# Patient Record
Sex: FEMALE | Race: WHITE | Employment: OTHER | URBAN - METROPOLITAN AREA
[De-identification: names, ages, dates, MRNs, and addresses within clinical notes are randomized per-mention and may not be internally consistent; named-entity substitution may affect disease eponyms.]

---

## 2017-01-20 ENCOUNTER — HOSPITAL ENCOUNTER (OUTPATIENT)
Dept: MAMMOGRAPHY | Age: 72
Discharge: HOME | End: 2017-01-20
Attending: INTERNAL MEDICINE | Admitting: INTERNAL MEDICINE

## 2017-01-20 DIAGNOSIS — Z12.31 VISIT FOR SCREENING MAMMOGRAM: ICD-10-CM

## 2017-12-01 NOTE — PRE-PROCEDURE INSTRUCTIONS
PRE OP INSTRUCTION SHEET   1. Do not eat or drink anything after 12 midnight  prior to surgery. This includes no water, chewing gum or mints. 2. Take the following pills will a small sip of water (see MAR)                                        3. Aspirin, Ibuprofen, Advil, Naproxen, Vitamin E, fish oil and other Anti-inflammatory products should be stopped for 5 days before surgery or as directed by your physician. 4. Check with your Doctor regarding stopping Plavix, Coumadin, Lovenox, Fragmin or other blood thinners   5. Do not smoke, and do not drink any alcoholic beverages 24 hours prior to surgery. This includes NA Beer. 6. You may brush your teeth and gargle the morning of surgery. DO NOT SWALLOW WATER   7. You MUST make arrangements for a responsible adult to take you home after your surgery. You will not be allowed to leave alone or drive yourself home. It is strongly suggested someone stay with you the first 24 hrs. Your surgery will be cancelled if you do not have a ride home. 8. A parent/legal guardian must accompany a child scheduled for surgery and plan to stay at the hospital until the child is discharged. Please do not bring other children with you. 9. Please wear simple, loose fitting clothing to the hospital.  Tomas Boast not bring valuables (money, credit cards, checkbooks, etc.) Do not wear any makeup (including no eye makeup) or nail polish on your fingers or toes. 10. DO NOT wear any jewelry or piercings on day of surgery. All body piercing jewelry must be removed. 11. If you have dentures,glasses, or contacts they will be removed before going to the OR; we will provide you a container. 12. Please see your family doctor/and cardiologist for a history & physical and/or concerning medications. Bring any test results/reports from your physician's office. Have history and labs faxed to [phone].  Remember to bring Blood Bank bracelet on the day of surgery. 14. If you have a Living Will and Durable Power of  for Healthcare, please bring in a copy. 13. Notify your Surgeon if you develop any illness between now and surgery  time, cough, cold, fever, sore throat, nausea, vomiting, etc.  Please notify your surgeon if you experience dizziness, shortness of breath or blurred vision between now & the time of your surgery   16. DO NOT shave your operative site 96 hours prior to surgery. For face & neck surgery, men may use an electric razor 48 hours prior to surgery. 17. Shower with _x__Antibacterial soap (x_chlorhexidine for total joint  Pt's) shower two times before surgery.(the morning of and the night before. 18. To provide excellent care visitors will be limited to one in the room at any given time.   Please call pre admission testing if you any further questions [phone] or [phone]

## 2017-12-01 NOTE — PROGRESS NOTES
Obstructive Sleep Apnea (RADHA) Screening     Patient:  Kelly Sparks    YOB: 1945      Medical Record #:  [id number]                     Date:  12/1/2017     1. Are you a loud and/or regular snorer? []  Yes       [x] No    2. Have you been observed to gasp or stop breathing during sleep? []  Yes       [x] No    3. Do you feel tired or groggy upon awakening or do you awaken with a headache?           []  Yes       [x] No    4. Are you often tired or fatigued during the wake time hours? []  Yes       [x] No    5. Do you fall asleep sitting, reading, watching TV or driving? []  Yes       [x] No    6. Do you often have problems with memory or concentration? []  Yes       [x] No    **If patient's score is ? 3 they are considered high risk for RADHA. Notify the anesthesiologist of the high risk and document in focus note. Note:  If the patient's BMI is more than 35 kg m¯² , has neck circumference > 40 cm, and/or high blood pressure the risk is greater (© [employer], 2006).

## 2017-12-07 ENCOUNTER — HOSPITAL ENCOUNTER (OUTPATIENT)
Dept: SURGERY | Age: 72
Discharge: HOME | End: 2017-12-07
Attending: OPHTHALMOLOGY | Admitting: OPHTHALMOLOGY

## 2017-12-07 VITALS
OXYGEN SATURATION: 96 % | DIASTOLIC BLOOD PRESSURE: 71 MMHG | HEART RATE: 73 BPM | HEIGHT: 64 IN | WEIGHT: 136 LBS | RESPIRATION RATE: 74 BRPM | BODY MASS INDEX: 23.22 KG/M2 | TEMPERATURE: 97 F | SYSTOLIC BLOOD PRESSURE: 135 MMHG

## 2017-12-07 RX ORDER — ONDANSETRON 2 MG/ML
4 INJECTION INTRAMUSCULAR; INTRAVENOUS
Status: ACTIVE | OUTPATIENT
Start: 2017-12-07 | End: 2017-12-07

## 2017-12-07 RX ORDER — PREDNISOLONE ACETATE 10 MG/ML
1 SUSPENSION/ DROPS OPHTHALMIC CONTINUOUS
Status: DISCONTINUED | OUTPATIENT
Start: 2017-12-07 | End: 2017-12-08 | Stop reason: HOSPADM

## 2017-12-07 RX ORDER — HYDRALAZINE HYDROCHLORIDE 20 MG/ML
5 INJECTION INTRAMUSCULAR; INTRAVENOUS EVERY 10 MIN PRN
Status: DISCONTINUED | OUTPATIENT
Start: 2017-12-07 | End: 2017-12-08 | Stop reason: HOSPADM

## 2017-12-07 RX ORDER — MORPHINE SULFATE 10 MG/ML
2 INJECTION, SOLUTION INTRAMUSCULAR; INTRAVENOUS EVERY 5 MIN PRN
Status: DISCONTINUED | OUTPATIENT
Start: 2017-12-07 | End: 2017-12-08 | Stop reason: HOSPADM

## 2017-12-07 RX ORDER — MEPERIDINE HYDROCHLORIDE 25 MG/ML
12.5 INJECTION INTRAMUSCULAR; INTRAVENOUS; SUBCUTANEOUS EVERY 5 MIN PRN
Status: DISCONTINUED | OUTPATIENT
Start: 2017-12-07 | End: 2017-12-08 | Stop reason: HOSPADM

## 2017-12-07 RX ORDER — LIDOCAINE HYDROCHLORIDE 10 MG/ML
0.3 INJECTION, SOLUTION EPIDURAL; INFILTRATION; INTRACAUDAL; PERINEURAL
Status: DISPENSED | OUTPATIENT
Start: 2017-12-07 | End: 2017-12-07

## 2017-12-07 RX ORDER — HYDROMORPHONE HCL 110MG/55ML
0.25 PATIENT CONTROLLED ANALGESIA SYRINGE INTRAVENOUS EVERY 5 MIN PRN
Status: DISCONTINUED | OUTPATIENT
Start: 2017-12-07 | End: 2017-12-08 | Stop reason: HOSPADM

## 2017-12-07 RX ORDER — LABETALOL HYDROCHLORIDE 5 MG/ML
5 INJECTION, SOLUTION INTRAVENOUS EVERY 10 MIN PRN
Status: DISCONTINUED | OUTPATIENT
Start: 2017-12-07 | End: 2017-12-08 | Stop reason: HOSPADM

## 2017-12-07 RX ORDER — DIPHENHYDRAMINE HYDROCHLORIDE 50 MG/ML
12.5 INJECTION INTRAMUSCULAR; INTRAVENOUS
Status: ACTIVE | OUTPATIENT
Start: 2017-12-07 | End: 2017-12-07

## 2017-12-07 RX ORDER — OXYCODONE HYDROCHLORIDE AND ACETAMINOPHEN 5; 325 MG/1; MG/1
2 TABLET ORAL PRN
Status: ACTIVE | OUTPATIENT
Start: 2017-12-07 | End: 2017-12-07

## 2017-12-07 RX ORDER — SODIUM CHLORIDE 0.9 % (FLUSH) 0.9 %
10 SYRINGE (ML) INJECTION PRN
Status: DISCONTINUED | OUTPATIENT
Start: 2017-12-07 | End: 2017-12-08 | Stop reason: HOSPADM

## 2017-12-07 RX ORDER — SODIUM CHLORIDE, SODIUM LACTATE, POTASSIUM CHLORIDE, CALCIUM CHLORIDE 600; 310; 30; 20 MG/100ML; MG/100ML; MG/100ML; MG/100ML
INJECTION, SOLUTION INTRAVENOUS CONTINUOUS
Status: DISCONTINUED | OUTPATIENT
Start: 2017-12-07 | End: 2017-12-08 | Stop reason: HOSPADM

## 2017-12-07 RX ORDER — SODIUM CHLORIDE 0.9 % (FLUSH) 0.9 %
10 SYRINGE (ML) INJECTION EVERY 12 HOURS SCHEDULED
Status: DISCONTINUED | OUTPATIENT
Start: 2017-12-07 | End: 2017-12-08 | Stop reason: HOSPADM

## 2017-12-07 RX ORDER — HYDROMORPHONE HCL 110MG/55ML
0.5 PATIENT CONTROLLED ANALGESIA SYRINGE INTRAVENOUS EVERY 5 MIN PRN
Status: DISCONTINUED | OUTPATIENT
Start: 2017-12-07 | End: 2017-12-08 | Stop reason: HOSPADM

## 2017-12-07 RX ORDER — PROMETHAZINE HYDROCHLORIDE 25 MG/ML
6.25 INJECTION, SOLUTION INTRAMUSCULAR; INTRAVENOUS
Status: ACTIVE | OUTPATIENT
Start: 2017-12-07 | End: 2017-12-07

## 2017-12-07 RX ORDER — TOBRAMYCIN AND DEXAMETHASONE 3; 1 MG/ML; MG/ML
1 SUSPENSION/ DROPS OPHTHALMIC CONTINUOUS
Status: DISCONTINUED | OUTPATIENT
Start: 2017-12-07 | End: 2017-12-08 | Stop reason: HOSPADM

## 2017-12-07 RX ORDER — OXYCODONE HYDROCHLORIDE AND ACETAMINOPHEN 5; 325 MG/1; MG/1
1 TABLET ORAL PRN
Status: ACTIVE | OUTPATIENT
Start: 2017-12-07 | End: 2017-12-07

## 2017-12-07 RX ORDER — MORPHINE SULFATE 4 MG/ML
1 INJECTION, SOLUTION INTRAMUSCULAR; INTRAVENOUS EVERY 5 MIN PRN
Status: DISCONTINUED | OUTPATIENT
Start: 2017-12-07 | End: 2017-12-08 | Stop reason: HOSPADM

## 2017-12-07 RX ADMIN — SODIUM CHLORIDE, SODIUM LACTATE, POTASSIUM CHLORIDE, CALCIUM CHLORIDE: 600; 310; 30; 20 INJECTION, SOLUTION INTRAVENOUS at 11:14

## 2017-12-07 RX ADMIN — TOBRAMYCIN AND DEXAMETHASONE 1 DROP: 3; 1 SUSPENSION/ DROPS OPHTHALMIC at 13:30

## 2017-12-07 ASSESSMENT — PAIN SCALES - GENERAL
PAINLEVEL_OUTOF10: 0
PAINLEVEL_OUTOF10: 0

## 2017-12-07 ASSESSMENT — PAIN - FUNCTIONAL ASSESSMENT: PAIN_FUNCTIONAL_ASSESSMENT: 0-10

## 2017-12-07 NOTE — ANESTHESIA PRE-OP
Department of Anesthesiology  Preprocedure Note       Name:  Lauren Weinberg   Age:  67 y.o.  :  1945                                          MRN:  [id number]         Date:  2017      Surgeon:    Procedure:    Medications prior to admission:   Prior to Admission medications    Medication Sig Start Date End Date Taking?  Authorizing Provider   fluticasone-salmeterol (ADVAIR HFA) 230-21 MCG/ACT inhaler Inhale 2 puffs into the lungs 2 times daily   Yes Historical Provider, MD   aspirin 325 MG tablet Take 325 mg by mouth daily   Yes Historical Provider, MD   Azelastine-Fluticasone 137-50 MCG/ACT SUSP by Nasal route   Yes Historical Provider, MD   venlafaxine (EFFEXOR XR) 75 MG extended release capsule Take 75 mg by mouth daily   Yes Historical Provider, MD   omeprazole (PRILOSEC) 20 MG delayed release capsule Take 40 mg by mouth daily   Yes Historical Provider, MD   valACYclovir (VALTREX) 500 MG tablet Take 1,000 mg by mouth 2 times daily   Yes Historical Provider, MD   amLODIPine (NORVASC) 2.5 MG tablet Take 2.5 mg by mouth daily   Yes Historical Provider, MD   ClonazePAM (KLONOPIN PO) Take 0.5 mg by mouth 3 times daily    Yes Historical Provider, MD   Simvastatin (ZOCOR PO) Take 20 mg by mouth nightly    Yes Historical Provider, MD   gabapentin (NEURONTIN) 100 MG capsule Take 300 mg by mouth 3 times daily    Yes Historical Provider, MD   Multiple Vitamins-Minerals (THERAPEUTIC MULTIVITAMIN-MINERALS) tablet Take 1 tablet by mouth daily   Yes Historical Provider, MD   albuterol sulfate  (90 BASE) MCG/ACT inhaler Inhale 2 puffs into the lungs every 6 hours as needed for Wheezing    Historical Provider, MD       Current medications:    Current Outpatient Prescriptions   Medication Sig Dispense Refill    fluticasone-salmeterol (ADVAIR HFA) 230-21 MCG/ACT inhaler Inhale 2 puffs into the lungs 2 times daily      aspirin 325 MG tablet Take 325 mg by mouth daily      Azelastine-Fluticasone 137-50 MCG/ACT SUSP by Nasal route      venlafaxine (EFFEXOR XR) 75 MG extended release capsule Take 75 mg by mouth daily      omeprazole (PRILOSEC) 20 MG delayed release capsule Take 40 mg by mouth daily      valACYclovir (VALTREX) 500 MG tablet Take 1,000 mg by mouth 2 times daily      amLODIPine (NORVASC) 2.5 MG tablet Take 2.5 mg by mouth daily      ClonazePAM (KLONOPIN PO) Take 0.5 mg by mouth 3 times daily       Simvastatin (ZOCOR PO) Take 20 mg by mouth nightly       gabapentin (NEURONTIN) 100 MG capsule Take 300 mg by mouth 3 times daily       Multiple Vitamins-Minerals (THERAPEUTIC MULTIVITAMIN-MINERALS) tablet Take 1 tablet by mouth daily      albuterol sulfate  (90 BASE) MCG/ACT inhaler Inhale 2 puffs into the lungs every 6 hours as needed for Wheezing       Current Facility-Administered Medications   Medication Dose Route Frequency Provider Last Rate Last Dose    bromfenac (PROLENSA) 0.07 % ophthalmic solution 1 drop  1 drop Left Eye Continuous Kevin Fernandez MD        prednisoLONE acetate (PRED FORTE) 1 % ophthalmic suspension 1 drop  1 drop Ophthalmic Continuous Kevin Fernandez MD        tobramycin-dexamethasone [facility]) ophthalmic suspension 1 drop  1 drop Ophthalmic Continuous Kevin Fernandez MD        lactated ringers infusion   Intravenous Continuous Gen Downing  mL/hr at 12/07/17 1114      sodium chloride flush 0.9 % injection 10 mL  10 mL Intravenous 2 times per day Gen Downing MD        sodium chloride flush 0.9 % injection 10 mL  10 mL Intravenous PRN Gen Downing MD        lidocaine PF 1 % injection 0.3 mL  0.3 mL Intradermal Once PRN Gen Downing MD        lidocaine PF 2 % in hylenex   Intraocular Once Kevin Fernandez MD           Allergies:     Allergies   Allergen Reactions    Amoxicillin Hives    Dicloxacillin Hives    Hexachlorophene      Phisohex - rash    Pcn [Penicillins] Hives       Problem List:    Patient Active Problem List   Diagnosis Code    Pulmonary:   (+) asthma:                            Cardiovascular:    (+) hypertension:,          Beta Blocker:  Not on Beta Blocker         Neuro/Psych:   (+) neuromuscular disease:, psychiatric history:            GI/Hepatic/Renal:   (+) GERD: well controlled,           Endo/Other: Negative Endo/Other ROS                    Abdominal:           Vascular:                                        Anesthesia Plan      MAC     ASA 2     (Risks, benefits and alternatives of MAC anesthesia discussed with pt. Questions answered. Willing to proceed.)  Induction: intravenous. Anesthetic plan and risks discussed with patient.                       Penny Pena MD   12/7/2017

## 2018-01-02 ENCOUNTER — HOSPITAL ENCOUNTER (OUTPATIENT)
Dept: SURGERY | Age: 73
Discharge: HOME | End: 2018-01-02
Attending: OPHTHALMOLOGY | Admitting: OPHTHALMOLOGY

## 2018-01-02 VITALS
HEART RATE: 65 BPM | BODY MASS INDEX: 23.05 KG/M2 | SYSTOLIC BLOOD PRESSURE: 145 MMHG | DIASTOLIC BLOOD PRESSURE: 71 MMHG | RESPIRATION RATE: 16 BRPM | TEMPERATURE: 97.3 F | OXYGEN SATURATION: 95 % | HEIGHT: 64 IN | WEIGHT: 135 LBS

## 2018-01-02 RX ORDER — SODIUM CHLORIDE, SODIUM LACTATE, POTASSIUM CHLORIDE, CALCIUM CHLORIDE 600; 310; 30; 20 MG/100ML; MG/100ML; MG/100ML; MG/100ML
INJECTION, SOLUTION INTRAVENOUS CONTINUOUS
Status: DISCONTINUED | OUTPATIENT
Start: 2018-01-02 | End: 2018-01-03 | Stop reason: HOSPADM

## 2018-01-02 RX ORDER — DICYCLOMINE HCL 20 MG
TABLET ORAL
Refills: 0 | COMMUNITY
Start: 2017-12-14 | End: 2022-01-03

## 2018-01-02 RX ADMIN — SODIUM CHLORIDE, SODIUM LACTATE, POTASSIUM CHLORIDE, CALCIUM CHLORIDE: 600; 310; 30; 20 INJECTION, SOLUTION INTRAVENOUS at 09:22

## 2018-01-02 ASSESSMENT — PAIN - FUNCTIONAL ASSESSMENT: PAIN_FUNCTIONAL_ASSESSMENT: 0-10

## 2018-01-02 ASSESSMENT — PAIN SCALES - GENERAL: PAINLEVEL_OUTOF10: 0

## 2018-01-02 NOTE — ANESTHESIA PRE-OP
Department of Anesthesiology  Preprocedure Note       Name:  Chari Phoenix   Age:  67 y.o.  :  1945                                          MRN:  [id number]         Date:  2018      Surgeon:    Procedure:    Medications prior to admission:   Prior to Admission medications    Medication Sig Start Date End Date Taking?  Authorizing Provider   Calcium Carbonate-Vitamin D 500-400 MG-UNIT TABS Take by mouth   Yes Historical Provider, MD   dicyclomine (BENTYL) 20 MG tablet TK 1 T PO BID 17  Yes Historical Provider, MD   fluticasone-salmeterol (ADVAIR HFA) 230-21 MCG/ACT inhaler Inhale 2 puffs into the lungs 2 times daily   Yes Historical Provider, MD   albuterol sulfate  (90 BASE) MCG/ACT inhaler Inhale 2 puffs into the lungs every 6 hours as needed for Wheezing   Yes Historical Provider, MD   venlafaxine (EFFEXOR XR) 75 MG extended release capsule Take 75 mg by mouth daily   Yes Historical Provider, MD   omeprazole (PRILOSEC) 20 MG delayed release capsule Take 40 mg by mouth daily   Yes Historical Provider, MD   valACYclovir (VALTREX) 500 MG tablet Take 1,000 mg by mouth 2 times daily   Yes Historical Provider, MD   amLODIPine (NORVASC) 2.5 MG tablet Take 2.5 mg by mouth daily   Yes Historical Provider, MD   ClonazePAM (KLONOPIN PO) Take 0.5 mg by mouth 3 times daily    Yes Historical Provider, MD   Simvastatin (ZOCOR PO) Take 20 mg by mouth nightly    Yes Historical Provider, MD   aspirin 325 MG tablet Take 325 mg by mouth daily    Historical Provider, MD   Multiple Vitamins-Minerals (THERAPEUTIC MULTIVITAMIN-MINERALS) tablet Take 1 tablet by mouth daily    Historical Provider, MD       Current medications:    Current Outpatient Prescriptions   Medication Sig Dispense Refill    Calcium Carbonate-Vitamin D 500-400 MG-UNIT TABS Take by mouth      dicyclomine (BENTYL) 20 MG tablet TK 1 T PO BID  0    fluticasone-salmeterol (ADVAIR HFA) 230-21 MCG/ACT inhaler Inhale 2 puffs into the lungs 2 times daily      albuterol sulfate  (90 BASE) MCG/ACT inhaler Inhale 2 puffs into the lungs every 6 hours as needed for Wheezing      venlafaxine (EFFEXOR XR) 75 MG extended release capsule Take 75 mg by mouth daily      omeprazole (PRILOSEC) 20 MG delayed release capsule Take 40 mg by mouth daily      valACYclovir (VALTREX) 500 MG tablet Take 1,000 mg by mouth 2 times daily      amLODIPine (NORVASC) 2.5 MG tablet Take 2.5 mg by mouth daily      ClonazePAM (KLONOPIN PO) Take 0.5 mg by mouth 3 times daily       Simvastatin (ZOCOR PO) Take 20 mg by mouth nightly       aspirin 325 MG tablet Take 325 mg by mouth daily      Multiple Vitamins-Minerals (THERAPEUTIC MULTIVITAMIN-MINERALS) tablet Take 1 tablet by mouth daily       Current Facility-Administered Medications   Medication Dose Route Frequency Provider Last Rate Last Dose    lactated ringers infusion   Intravenous Continuous Donnell Norman MD 50 mL/hr at 01/02/18 0922      lidocaine PF 2 % in hylenex   Intraocular Once Melita Bass MD           Allergies:     Allergies   Allergen Reactions    Amoxicillin Hives    Dicloxacillin Hives    Gluten Meal Diarrhea    Hexachlorophene      Phisohex - rash    Lactose     Pcn [Penicillins] Hives       Problem List:    Patient Active Problem List   Diagnosis Code    Chronic bilateral low back pain M54.5, G89.29    Lumbar degenerative disc disease    SEVERE L45  M51.36    Fracture, lumbar vertebra, compression (HCC)   L5 AND L4 S32.000A    Lumbar spine instability   L45 M53.2X6       Past Medical History:        Diagnosis Date    Asthma     Back pain     Chronic pain     hip    Compression fracture     fourth lumbar vertebra with delayed healing    DDD (degenerative disc disease), cervical     Depression     Hyperlipidemia     Hypertension     Lumbar stenosis     Osteopenia     Osteoporosis     PONV (postoperative nausea and vomiting)     Sciatica     Seasonal allergies

## 2018-01-02 NOTE — OP NOTE
OPERATIVE NOTE    Patient Name   Date of Birth Age  MRN#  Valiant Gehrig   1945   67 y.o.  [id number]       Surgeon        Surgery Date  Mindy Fernandez        1/2/2018       Preoperative Diagnosis: Nuclear Sclerotic Cataract right eye    Postoperative Diagnosis: Nuclear Sclerotic Cataract right eye    Operative Procedure: Phacoemulsification/ Posterior Chamber Intraocular Lens Implantation          Anesthesia:   Peribulbar Block with MAC    Details of Procedure: The patient was brought to the operating room on the operative stretcher in the supine position with the head resting in the head rest.  After appropriate anesthesia monitoring devices were applied, IV sedation was carried out per the anesthesia service. Once sedation was achieved, a peribulbar block was performed, using a 5 mL combination solution containing 4 mL of 2% lidocaine with 1 mL of Vitrase. Approximately 2-3 mL of this solution was administered in a peribulbar fashion through the lower lid of the operative eye. Once appropriate akinesia and anesthesia were demonstrated, the operative eye was prepped and draped in the usual sterile fashion. Tobradex drops and Tetracain drops were administered. A wire lid speculum was then inserted into the operative eye. A paracentesis was then performed using a 15 degree supersharp blade to enter the globe at the limbus, and a .12 mm colibri forceps was used to stabilize the globe. Viscoat was then instilled into the anterior chamber. A temporal clear cornea groove was then created using the 15 degree supersharp blade. The cornea was then entered using the Tom 2.4 mm clearcut keratome blade. The capsulotomy was then performed using a cystotome, and the capsulorrhexis was completed using uttrata forceps. The nucleus of the cataract was then hydrodissected and hydrodelineated using sterile BSS on a 27 gauge cannula.   The nucleus of the cataract was then removed using the phacoemilsification/aspiration device. This was performed in a bimanual/CHOP technique. The remaining cortex was then removed using the irrigation/aspiration device. The posterior capsule was polished using the I/A device with CAP/VAC settings. The capsular bag was reformed using Provisc. The previously selected Tom Acrysof +23.0 diopter [id number] intraocular lens implant was then inserted using the cartridge system. It was spun in place using a Kuglen hook. It was centered and found to be in good, stable position. The remaining viscoelastic was then removed using the I/A device. The corneal incision was then hydrated using sterile BSS on a 30 gauge cannula. It was checked and found to be water-tight. Pilocarpine 2%, followed by Tobradex ophthalmic solutions were then instilled into the operative eye. The wire lid speculum was removed, and a postoperative protective shield was applied. The patient was then transferred to the postanesthesia recovery unit in good stable condition. The patient tolerated the procedure well without complications. A postoperative instruction sheet was given, and the patient will follow up with Dr. Disha Salazar office as scheduled.       EBL: none    Specimen: none

## 2018-01-02 NOTE — ANESTHESIA POST-OP
Postoperative Anesthesia Note    Name:    Oliver Bolaños  MRN:      [id number]    Patient Vitals for the past 12 hrs:   BP Temp Temp src Pulse Resp SpO2 Height Weight   01/02/18 1124 (!) 145/71 - - 65 16 95 % - -   01/02/18 0909 (!) 157/69 97.3 °F (36.3 °C) Temporal 60 16 97 % 5' 4\" (1.626 m) 135 lb (61.2 kg)        LABS:    CBC  Lab Results   Component Value Date/Time    WBC 15.8 (H) 10/03/2016 12:55 PM    HGB 14.6 10/03/2016 12:55 PM    HCT 44.2 10/03/2016 12:55 PM     10/03/2016 12:55 PM     RENAL  No results found for: NA, K, CL, CO2, BUN, CREATININE, GLUCOSE  COAGS  No results found for: PROTIME, INR, APTT    Intake & Output: In: 400 [I.V.:400]  Out: -     Nausea & Vomiting:  No    Level of Consciousness:  Awake    Pain Assessment:  Adequate analgesia    Anesthesia Complications:  No apparent anesthetic complications    SUMMARY      Vital signs stable  OK to discharge from Stage I post anesthesia care.   Care transferred from Anesthesiology department on discharge from perioperative area

## 2018-07-30 ENCOUNTER — HOSPITAL ENCOUNTER (OUTPATIENT)
Dept: MAMMOGRAPHY | Age: 73
Discharge: HOME | End: 2018-07-30
Payer: MEDICARE

## 2018-07-30 DIAGNOSIS — Z12.31 VISIT FOR SCREENING MAMMOGRAM: ICD-10-CM

## 2018-07-30 PROCEDURE — 77063 BREAST TOMOSYNTHESIS BI: CPT

## 2019-12-13 ENCOUNTER — HOSPITAL ENCOUNTER (OUTPATIENT)
Dept: MAMMOGRAPHY | Age: 74
Discharge: HOME | End: 2019-12-13
Payer: MEDICARE

## 2019-12-13 DIAGNOSIS — Z12.31 VISIT FOR SCREENING MAMMOGRAM: ICD-10-CM

## 2019-12-13 PROCEDURE — 77063 BREAST TOMOSYNTHESIS BI: CPT

## 2019-12-27 ENCOUNTER — HOSPITAL ENCOUNTER (OUTPATIENT)
Dept: MAMMOGRAPHY | Age: 74
Discharge: HOME | End: 2019-12-27
Payer: MEDICARE

## 2019-12-27 DIAGNOSIS — R92.8 ABNORMAL MAMMOGRAM: ICD-10-CM

## 2019-12-27 PROCEDURE — G0279 TOMOSYNTHESIS, MAMMO: HCPCS

## 2020-05-23 ENCOUNTER — APPOINTMENT (OUTPATIENT)
Dept: CT IMAGING | Age: 75
DRG: 871 | End: 2020-05-23
Payer: MEDICARE

## 2020-05-23 ENCOUNTER — HOSPITAL ENCOUNTER (INPATIENT)
Age: 75
LOS: 3 days | Discharge: HOME | DRG: 871 | End: 2020-05-26
Attending: EMERGENCY MEDICINE | Admitting: INTERNAL MEDICINE
Payer: MEDICARE

## 2020-05-23 PROBLEM — K52.9 COLITIS: Status: ACTIVE | Noted: 2020-05-23

## 2020-05-23 LAB
A/G RATIO: 1.4 (ref 1.1–2.2)
ALBUMIN SERPL-MCNC: 4.4 G/DL (ref 3.4–5)
ALP BLD-CCNC: 77 U/L (ref 40–129)
ALT SERPL-CCNC: 10 U/L (ref 10–40)
ANION GAP SERPL CALCULATED.3IONS-SCNC: 13 MMOL/L (ref 3–16)
AST SERPL-CCNC: 22 U/L (ref 15–37)
BACTERIA: ABNORMAL /HPF
BANDED NEUTROPHILS RELATIVE PERCENT: 2 % (ref 0–7)
BASOPHILS ABSOLUTE: 0 K/UL (ref 0–0.2)
BASOPHILS RELATIVE PERCENT: 0 %
BILIRUB SERPL-MCNC: 1.3 MG/DL (ref 0–1)
BILIRUBIN URINE: NEGATIVE
BLOOD, URINE: ABNORMAL
BUN BLDV-MCNC: 13 MG/DL (ref 7–20)
CALCIUM SERPL-MCNC: 9.3 MG/DL (ref 8.3–10.6)
CHLORIDE BLD-SCNC: 99 MMOL/L (ref 99–110)
CLARITY: ABNORMAL
CO2: 23 MMOL/L (ref 21–32)
COLOR: ABNORMAL
CREAT SERPL-MCNC: <0.5 MG/DL (ref 0.6–1.2)
EOSINOPHILS ABSOLUTE: 0 K/UL (ref 0–0.6)
EOSINOPHILS RELATIVE PERCENT: 0 %
EPITHELIAL CELLS, UA: ABNORMAL /HPF (ref 0–5)
GFR AFRICAN AMERICAN: >60
GFR NON-AFRICAN AMERICAN: >60
GLOBULIN: 3.1 G/DL
GLUCOSE BLD-MCNC: 126 MG/DL (ref 70–99)
GLUCOSE URINE: NEGATIVE MG/DL
HCT VFR BLD CALC: 43.2 % (ref 36–48)
HCT VFR BLD CALC: 43.4 % (ref 36–48)
HEMOGLOBIN: 14.1 G/DL (ref 12–16)
HEMOGLOBIN: 14.7 G/DL (ref 12–16)
KETONES, URINE: NEGATIVE MG/DL
LACTIC ACID: 1.2 MMOL/L (ref 0.4–2)
LEUKOCYTE ESTERASE, URINE: NEGATIVE
LYMPHOCYTES ABSOLUTE: 1.2 K/UL (ref 1–5.1)
LYMPHOCYTES RELATIVE PERCENT: 5 %
MCH RBC QN AUTO: 31.8 PG (ref 26–34)
MCHC RBC AUTO-ENTMCNC: 33.9 G/DL (ref 31–36)
MCV RBC AUTO: 93.8 FL (ref 80–100)
MICROSCOPIC EXAMINATION: YES
MONOCYTES ABSOLUTE: 1.2 K/UL (ref 0–1.3)
MONOCYTES RELATIVE PERCENT: 5 %
NEUTROPHILS ABSOLUTE: 21.4 K/UL (ref 1.7–7.7)
NEUTROPHILS RELATIVE PERCENT: 88 %
NITRITE, URINE: NEGATIVE
PDW BLD-RTO: 13.6 % (ref 12.4–15.4)
PH UA: 7 (ref 5–8)
PLATELET # BLD: 395 K/UL (ref 135–450)
PLATELET SLIDE REVIEW: ADEQUATE
PMV BLD AUTO: 7.9 FL (ref 5–10.5)
POTASSIUM SERPL-SCNC: 3.7 MMOL/L (ref 3.5–5.1)
PROCALCITONIN: 0.11 NG/ML (ref 0–0.15)
PROTEIN UA: NEGATIVE MG/DL
RBC # BLD: 4.6 M/UL (ref 4–5.2)
RBC # BLD: NORMAL 10*6/UL
RBC UA: ABNORMAL /HPF (ref 0–4)
SLIDE REVIEW: ABNORMAL
SODIUM BLD-SCNC: 135 MMOL/L (ref 136–145)
SPECIFIC GRAVITY UA: <=1.005 (ref 1–1.03)
TOTAL PROTEIN: 7.5 G/DL (ref 6.4–8.2)
URINE TYPE: ABNORMAL
UROBILINOGEN, URINE: 0.2 E.U./DL
WBC # BLD: 23.8 K/UL (ref 4–11)
WBC UA: ABNORMAL /HPF (ref 0–5)

## 2020-05-23 PROCEDURE — 6370000000 HC RX 637 (ALT 250 FOR IP): Performed by: NURSE PRACTITIONER

## 2020-05-23 PROCEDURE — 6370000000 HC RX 637 (ALT 250 FOR IP)

## 2020-05-23 PROCEDURE — 85025 COMPLETE CBC W/AUTO DIFF WBC: CPT

## 2020-05-23 PROCEDURE — 80053 COMPREHEN METABOLIC PANEL: CPT

## 2020-05-23 PROCEDURE — 74177 CT ABD & PELVIS W/CONTRAST: CPT

## 2020-05-23 PROCEDURE — 84145 PROCALCITONIN (PCT): CPT

## 2020-05-23 PROCEDURE — 6360000004 HC RX CONTRAST MEDICATION: Performed by: NURSE PRACTITIONER

## 2020-05-23 PROCEDURE — 81001 URINALYSIS AUTO W/SCOPE: CPT

## 2020-05-23 PROCEDURE — 85018 HEMOGLOBIN: CPT

## 2020-05-23 PROCEDURE — 1200000000 HC SEMI PRIVATE

## 2020-05-23 PROCEDURE — 6360000002 HC RX W HCPCS: Performed by: NURSE PRACTITIONER

## 2020-05-23 PROCEDURE — 2500000003 HC RX 250 WO HCPCS: Performed by: NURSE PRACTITIONER

## 2020-05-23 PROCEDURE — 2580000003 HC RX 258: Performed by: NURSE PRACTITIONER

## 2020-05-23 PROCEDURE — 83605 ASSAY OF LACTIC ACID: CPT

## 2020-05-23 PROCEDURE — 85014 HEMATOCRIT: CPT

## 2020-05-23 PROCEDURE — 99284 EMERGENCY DEPT VISIT MOD MDM: CPT

## 2020-05-23 RX ORDER — POTASSIUM CHLORIDE 7.45 MG/ML
10 INJECTION INTRAVENOUS PRN
Status: DISCONTINUED | OUTPATIENT
Start: 2020-05-23 | End: 2020-05-26

## 2020-05-23 RX ORDER — VENLAFAXINE HYDROCHLORIDE 37.5 MG/1
75 CAPSULE, EXTENDED RELEASE ORAL DAILY
Status: DISCONTINUED | OUTPATIENT
Start: 2020-05-24 | End: 2020-05-26 | Stop reason: HOSPADM

## 2020-05-23 RX ORDER — AMLODIPINE BESYLATE 2.5 MG/1
2.5 TABLET ORAL DAILY
Status: DISCONTINUED | OUTPATIENT
Start: 2020-05-24 | End: 2020-05-26 | Stop reason: HOSPADM

## 2020-05-23 RX ORDER — SODIUM CHLORIDE 9 MG/ML
INJECTION, SOLUTION INTRAVENOUS CONTINUOUS
Status: DISCONTINUED | OUTPATIENT
Start: 2020-05-23 | End: 2020-05-26

## 2020-05-23 RX ORDER — SODIUM CHLORIDE 0.9 % (FLUSH) 0.9 %
10 SYRINGE (ML) INJECTION EVERY 12 HOURS SCHEDULED
Status: DISCONTINUED | OUTPATIENT
Start: 2020-05-23 | End: 2020-05-26 | Stop reason: HOSPADM

## 2020-05-23 RX ORDER — ATORVASTATIN CALCIUM 10 MG/1
10 TABLET, FILM COATED ORAL NIGHTLY
Status: DISCONTINUED | OUTPATIENT
Start: 2020-05-23 | End: 2020-05-26 | Stop reason: HOSPADM

## 2020-05-23 RX ORDER — BUDESONIDE AND FORMOTEROL FUMARATE DIHYDRATE 160; 4.5 UG/1; UG/1
2 AEROSOL RESPIRATORY (INHALATION) 2 TIMES DAILY
Status: DISCONTINUED | OUTPATIENT
Start: 2020-05-23 | End: 2020-05-26 | Stop reason: HOSPADM

## 2020-05-23 RX ORDER — PANTOPRAZOLE SODIUM 40 MG/1
40 TABLET, DELAYED RELEASE ORAL
Status: DISCONTINUED | OUTPATIENT
Start: 2020-05-24 | End: 2020-05-26 | Stop reason: HOSPADM

## 2020-05-23 RX ORDER — SODIUM CHLORIDE 0.9 % (FLUSH) 0.9 %
10 SYRINGE (ML) INJECTION PRN
Status: DISCONTINUED | OUTPATIENT
Start: 2020-05-23 | End: 2020-05-26 | Stop reason: HOSPADM

## 2020-05-23 RX ORDER — ACETAMINOPHEN 325 MG/1
650 TABLET ORAL EVERY 4 HOURS PRN
Status: DISCONTINUED | OUTPATIENT
Start: 2020-05-23 | End: 2020-05-26 | Stop reason: HOSPADM

## 2020-05-23 RX ORDER — DICYCLOMINE HCL 20 MG
20 TABLET ORAL 2 TIMES DAILY
Status: DISCONTINUED | OUTPATIENT
Start: 2020-05-23 | End: 2020-05-26 | Stop reason: HOSPADM

## 2020-05-23 RX ORDER — ONDANSETRON 2 MG/ML
4 INJECTION INTRAMUSCULAR; INTRAVENOUS EVERY 6 HOURS PRN
Status: DISCONTINUED | OUTPATIENT
Start: 2020-05-23 | End: 2020-05-26 | Stop reason: HOSPADM

## 2020-05-23 RX ORDER — CIPROFLOXACIN 2 MG/ML
400 INJECTION, SOLUTION INTRAVENOUS EVERY 12 HOURS
Status: DISCONTINUED | OUTPATIENT
Start: 2020-05-24 | End: 2020-05-26 | Stop reason: HOSPADM

## 2020-05-23 RX ORDER — CIPROFLOXACIN 2 MG/ML
400 INJECTION, SOLUTION INTRAVENOUS ONCE
Status: COMPLETED | OUTPATIENT
Start: 2020-05-23 | End: 2020-05-23

## 2020-05-23 RX ORDER — POTASSIUM CHLORIDE 20 MEQ/1
40 TABLET, EXTENDED RELEASE ORAL PRN
Status: DISCONTINUED | OUTPATIENT
Start: 2020-05-23 | End: 2020-05-26

## 2020-05-23 RX ADMIN — METRONIDAZOLE 500 MG: 500 INJECTION, SOLUTION INTRAVENOUS at 21:30

## 2020-05-23 RX ADMIN — SODIUM CHLORIDE: 9 INJECTION, SOLUTION INTRAVENOUS at 23:08

## 2020-05-23 RX ADMIN — CIPROFLOXACIN 400 MG: 2 INJECTION, SOLUTION INTRAVENOUS at 20:55

## 2020-05-23 RX ADMIN — Medication 10 ML: at 22:33

## 2020-05-23 RX ADMIN — ATORVASTATIN CALCIUM 10 MG: 10 TABLET, FILM COATED ORAL at 23:08

## 2020-05-23 RX ADMIN — IOPAMIDOL 75 ML: 755 INJECTION, SOLUTION INTRAVENOUS at 19:43

## 2020-05-23 RX ADMIN — Medication: at 23:08

## 2020-05-23 RX ADMIN — DICYCLOMINE HYDROCHLORIDE 20 MG: 20 TABLET ORAL at 23:08

## 2020-05-24 LAB
A/G RATIO: 1.5 (ref 1.1–2.2)
ALBUMIN SERPL-MCNC: 3.8 G/DL (ref 3.4–5)
ALP BLD-CCNC: 62 U/L (ref 40–129)
ALT SERPL-CCNC: 8 U/L (ref 10–40)
ANION GAP SERPL CALCULATED.3IONS-SCNC: 9 MMOL/L (ref 3–16)
AST SERPL-CCNC: 14 U/L (ref 15–37)
BASOPHILS ABSOLUTE: 0.1 K/UL (ref 0–0.2)
BASOPHILS RELATIVE PERCENT: 0.3 %
BILIRUB SERPL-MCNC: 1.3 MG/DL (ref 0–1)
BUN BLDV-MCNC: 10 MG/DL (ref 7–20)
C DIFF TOXIN/ANTIGEN: NORMAL
CALCIUM SERPL-MCNC: 8.8 MG/DL (ref 8.3–10.6)
CHLORIDE BLD-SCNC: 102 MMOL/L (ref 99–110)
CO2: 27 MMOL/L (ref 21–32)
CREAT SERPL-MCNC: 0.6 MG/DL (ref 0.6–1.2)
EOSINOPHILS ABSOLUTE: 0.1 K/UL (ref 0–0.6)
EOSINOPHILS RELATIVE PERCENT: 0.4 %
GFR AFRICAN AMERICAN: >60
GFR NON-AFRICAN AMERICAN: >60
GLOBULIN: 2.6 G/DL
GLUCOSE BLD-MCNC: 129 MG/DL (ref 70–99)
HCT VFR BLD CALC: 40.5 % (ref 36–48)
HEMOGLOBIN: 13.3 G/DL (ref 12–16)
LYMPHOCYTES ABSOLUTE: 1.9 K/UL (ref 1–5.1)
LYMPHOCYTES RELATIVE PERCENT: 10.4 %
MCH RBC QN AUTO: 31 PG (ref 26–34)
MCHC RBC AUTO-ENTMCNC: 32.7 G/DL (ref 31–36)
MCV RBC AUTO: 94.6 FL (ref 80–100)
MONOCYTES ABSOLUTE: 1.8 K/UL (ref 0–1.3)
MONOCYTES RELATIVE PERCENT: 9.5 %
NEUTROPHILS ABSOLUTE: 14.8 K/UL (ref 1.7–7.7)
NEUTROPHILS RELATIVE PERCENT: 79.4 %
OCCULT BLOOD SCREENING: ABNORMAL
PDW BLD-RTO: 13.8 % (ref 12.4–15.4)
PLATELET # BLD: 339 K/UL (ref 135–450)
PMV BLD AUTO: 8.4 FL (ref 5–10.5)
POTASSIUM REFLEX MAGNESIUM: 3.6 MMOL/L (ref 3.5–5.1)
RBC # BLD: 4.29 M/UL (ref 4–5.2)
SODIUM BLD-SCNC: 138 MMOL/L (ref 136–145)
TOTAL PROTEIN: 6.4 G/DL (ref 6.4–8.2)
WBC # BLD: 18.6 K/UL (ref 4–11)
WHITE BLOOD CELLS (WBC), STOOL: ABNORMAL

## 2020-05-24 PROCEDURE — G0328 FECAL BLOOD SCRN IMMUNOASSAY: HCPCS

## 2020-05-24 PROCEDURE — 6360000002 HC RX W HCPCS: Performed by: NURSE PRACTITIONER

## 2020-05-24 PROCEDURE — 2500000003 HC RX 250 WO HCPCS: Performed by: NURSE PRACTITIONER

## 2020-05-24 PROCEDURE — 94640 AIRWAY INHALATION TREATMENT: CPT

## 2020-05-24 PROCEDURE — 6370000000 HC RX 637 (ALT 250 FOR IP): Performed by: NURSE PRACTITIONER

## 2020-05-24 PROCEDURE — 36415 COLL VENOUS BLD VENIPUNCTURE: CPT

## 2020-05-24 PROCEDURE — 85025 COMPLETE CBC W/AUTO DIFF WBC: CPT

## 2020-05-24 PROCEDURE — 87324 CLOSTRIDIUM AG IA: CPT

## 2020-05-24 PROCEDURE — 83630 LACTOFERRIN FECAL (QUAL): CPT

## 2020-05-24 PROCEDURE — 87449 NOS EACH ORGANISM AG IA: CPT

## 2020-05-24 PROCEDURE — 87505 NFCT AGENT DETECTION GI: CPT

## 2020-05-24 PROCEDURE — 1200000000 HC SEMI PRIVATE

## 2020-05-24 PROCEDURE — 2580000003 HC RX 258: Performed by: NURSE PRACTITIONER

## 2020-05-24 PROCEDURE — 80053 COMPREHEN METABOLIC PANEL: CPT

## 2020-05-24 RX ADMIN — SODIUM CHLORIDE: 9 INJECTION, SOLUTION INTRAVENOUS at 09:35

## 2020-05-24 RX ADMIN — METRONIDAZOLE 500 MG: 500 INJECTION, SOLUTION INTRAVENOUS at 06:00

## 2020-05-24 RX ADMIN — METRONIDAZOLE 500 MG: 500 INJECTION, SOLUTION INTRAVENOUS at 22:57

## 2020-05-24 RX ADMIN — METRONIDAZOLE 500 MG: 500 INJECTION, SOLUTION INTRAVENOUS at 15:33

## 2020-05-24 RX ADMIN — VENLAFAXINE HYDROCHLORIDE 75 MG: 37.5 CAPSULE, EXTENDED RELEASE ORAL at 09:10

## 2020-05-24 RX ADMIN — AMLODIPINE BESYLATE 2.5 MG: 2.5 TABLET ORAL at 09:31

## 2020-05-24 RX ADMIN — DICYCLOMINE HYDROCHLORIDE 20 MG: 20 TABLET ORAL at 20:36

## 2020-05-24 RX ADMIN — PANTOPRAZOLE SODIUM 40 MG: 40 TABLET, DELAYED RELEASE ORAL at 06:00

## 2020-05-24 RX ADMIN — DICYCLOMINE HYDROCHLORIDE 20 MG: 20 TABLET ORAL at 09:31

## 2020-05-24 RX ADMIN — Medication 2 PUFF: at 07:54

## 2020-05-24 RX ADMIN — CIPROFLOXACIN 400 MG: 2 INJECTION, SOLUTION INTRAVENOUS at 09:31

## 2020-05-24 RX ADMIN — CIPROFLOXACIN 400 MG: 2 INJECTION, SOLUTION INTRAVENOUS at 20:36

## 2020-05-24 RX ADMIN — ATORVASTATIN CALCIUM 10 MG: 10 TABLET, FILM COATED ORAL at 20:36

## 2020-05-24 RX ADMIN — SODIUM CHLORIDE: 9 INJECTION, SOLUTION INTRAVENOUS at 20:38

## 2020-05-24 ASSESSMENT — PAIN SCALES - GENERAL: PAINLEVEL_OUTOF10: 1

## 2020-05-24 ASSESSMENT — PAIN DESCRIPTION - LOCATION: LOCATION: ABDOMEN

## 2020-05-24 ASSESSMENT — PAIN DESCRIPTION - PAIN TYPE: TYPE: ACUTE PAIN

## 2020-05-24 NOTE — CONSULTS
BID  budesonide-formoterol (SYMBICORT) 160-4.5 MCG/ACT inhaler 2 puff, 2 puff, Inhalation, BID  atorvastatin (LIPITOR) tablet 10 mg, 10 mg, Oral, Nightly  venlafaxine (EFFEXOR XR) extended release capsule 75 mg, 75 mg, Oral, Daily  0.9 % sodium chloride infusion, , Intravenous, Continuous  sodium chloride flush 0.9 % injection 10 mL, 10 mL, Intravenous, 2 times per day  sodium chloride flush 0.9 % injection 10 mL, 10 mL, Intravenous, PRN  potassium chloride (KLOR-CON M) extended release tablet 40 mEq, 40 mEq, Oral, PRN **OR** potassium bicarb-citric acid (EFFER-K) effervescent tablet 40 mEq, 40 mEq, Oral, PRN **OR** potassium chloride 10 mEq/100 mL IVPB (Peripheral Line), 10 mEq, Intravenous, PRN  acetaminophen (TYLENOL) tablet 650 mg, 650 mg, Oral, Q4H PRN  ondansetron (ZOFRAN) injection 4 mg, 4 mg, Intravenous, Q6H PRN  ciprofloxacin (CIPRO) IVPB 400 mg, 400 mg, Intravenous, Q12H  metronidazole (FLAGYL) 500 mg in NaCl 100 mL IVPB premix, 500 mg, Intravenous, Q8H  pantoprazole (PROTONIX) tablet 40 mg, 40 mg, Oral, QAM AC  Allergies:  Amoxicillin; Dicloxacillin; Gluten meal; Hexachlorophene; Lactose; and Pcn [penicillins]    Social History:    Devoid of active TOB or ETOH use  Family History:   [place]  REVIEW OF SYSTEMS:    Positive for that which was noted in the HPI. All other systems reviewed and negative.      PHYSICAL EXAM:    Vitals:    BP (!) 140/72   Pulse 70   Temp 98 °F (36.7 °C) (Oral)   Resp 18   Ht 5' 4\" (1.626 m)   Wt 140 lb (63.5 kg)   SpO2 94%   BMI 24.03 kg/m²     GEN: awake, alert, conversant   HEENT: PERRLA, clear and moist oropharynx  Neck: supple, no masses, trachea midline, no JVD  Lungs: CTA-B w/o wheezes, rhonchi or rales, good A/E B  Cardiac:  RRR w/o murmurs, rubs or gallops  Abdomen: soft, mildly tender LUQ/LLQ, non distended w/o HSM, masses, ascites or bruits, NABs  EXT: no clubbing, cyanosis, edema or asterixis  Skin: no rashes, telangiectasias, lesions  Neuro: grossly non-focal    DATA:    CBC with Differential:    Lab Results   Component Value Date    WBC 18.6 05/24/2020    RBC 4.29 05/24/2020    HGB 13.3 05/24/2020    HCT 40.5 05/24/2020     05/24/2020    MCV 94.6 05/24/2020    MCH 31.0 05/24/2020    MCHC 32.7 05/24/2020    RDW 13.8 05/24/2020    BANDSPCT 2 05/23/2020    LYMPHOPCT 10.4 05/24/2020    MONOPCT 9.5 05/24/2020    BASOPCT 0.3 05/24/2020    MONOSABS 1.8 05/24/2020    LYMPHSABS 1.9 05/24/2020    EOSABS 0.1 05/24/2020    BASOSABS 0.1 05/24/2020     CMP:    Lab Results   Component Value Date     05/24/2020    K 3.6 05/24/2020     05/24/2020    CO2 27 05/24/2020    BUN 10 05/24/2020    CREATININE 0.6 05/24/2020    GFRAA >60 05/24/2020    AGRATIO 1.5 05/24/2020    LABGLOM >60 05/24/2020    GLUCOSE 129 05/24/2020    PROT 6.4 05/24/2020    LABALBU 3.8 05/24/2020    CALCIUM 8.8 05/24/2020    BILITOT 1.3 05/24/2020    ALKPHOS 62 05/24/2020    AST 14 05/24/2020    ALT 8 05/24/2020       Lactic Acid 1.2    CT:   Colitis involving the left colon extending from the splenic flexure to the   sigmoid colon.  Findings likely inflammatory or infectious in nature. IMPRESSION/RECOMMENDATIONS:  68yo F with h/o HTN and IBS is admitted with nausea, emesis and bloody diarrhea in the setting of leukocytosis and colonic wall thickening suspicious for infectious colitis.   The watershed distribution of inflammation raises the possibility of ischemia  - start clear liquids  - await stool cultures  - agree with empiric ATBs  - continue IVF, supportive care  - unprepped colonoscopy tomorrow  - upon discharge she will continue to follow with her gastroenterologist Dr Karely Jaimes

## 2020-05-24 NOTE — ED PROVIDER NOTES
Surgical History:   Procedure Laterality Date    CATARACT REMOVAL WITH IMPLANT Left 12/07/2017    Phacoemulsification/ Posterior Chamber Intraocular Lens Implantation w. dr Juwan Abad Right 01/02/2018    COLONOSCOPY      TOOTH EXTRACTION      TUBAL LIGATION           CURRENT MEDICATIONS:       Current Discharge Medication List      CONTINUE these medications which have NOT CHANGED    Details   Calcium Carbonate-Vitamin D 500-400 MG-UNIT TABS Take by mouth      dicyclomine (BENTYL) 20 MG tablet TK 1 T PO BID  Refills: 0      fluticasone-salmeterol (ADVAIR HFA) 230-21 MCG/ACT inhaler Inhale 2 puffs into the lungs 2 times daily      aspirin 325 MG tablet Take 325 mg by mouth daily      venlafaxine (EFFEXOR XR) 75 MG extended release capsule Take 75 mg by mouth daily      omeprazole (PRILOSEC) 20 MG delayed release capsule Take 40 mg by mouth daily      amLODIPine (NORVASC) 2.5 MG tablet Take 2.5 mg by mouth daily      ClonazePAM (KLONOPIN PO) Take 0.5 mg by mouth 3 times daily       Simvastatin (ZOCOR PO) Take 20 mg by mouth nightly       Multiple Vitamins-Minerals (THERAPEUTIC MULTIVITAMIN-MINERALS) tablet Take 1 tablet by mouth daily      valACYclovir (VALTREX) 500 MG tablet Take 1,000 mg by mouth 2 times daily               ALLERGIES:    Amoxicillin; Dicloxacillin; Gluten meal; Hexachlorophene; Lactose; and Pcn [penicillins]    FAMILY HISTORY:     No family history on file. SOCIAL HISTORY:       Social History     Socioeconomic History    Marital status:       Spouse name: Not on file    Number of children: Not on file    Years of education: Not on file    Highest education level: Not on file   Occupational History    Not on file   Social Needs    Financial resource strain: Not on file    Food insecurity     Worry: Not on file     Inability: Not on file    Transportation needs     Medical: Not on file     Non-medical: Not on file   Tobacco Use    Smoking status: Former Smoker    Smokeless tobacco: Never Used   Substance and Sexual Activity    Alcohol use: No    Drug use: No    Sexual activity: Not on file   Lifestyle    Physical activity     Days per week: Not on file     Minutes per session: Not on file    Stress: Not on file   Relationships    Social connections     Talks on phone: Not on file     Gets together: Not on file     Attends Restorationist service: Not on file     Active member of club or organization: Not on file     Attends meetings of clubs or organizations: Not on file     Relationship status: Not on file    Intimate partner violence     Fear of current or ex partner: Not on file     Emotionally abused: Not on file     Physically abused: Not on file     Forced sexual activity: Not on file   Other Topics Concern    Not on file   Social History Narrative    Not on file       SCREENINGS:   [place] Coma Scale  Eye Opening: Spontaneous  Best Verbal Response: Oriented  Best Motor Response: Obeys commands  [place] Coma Scale Score: 15        PHYSICAL EXAM:       ED Triage Vitals [05/23/20 1832]   BP Temp Temp Source Pulse Resp SpO2 Height Weight   (!) 137/97 98 °F (36.7 °C) Oral 97 18 98 % 5' 4\" (1.626 m) 140 lb (63.5 kg)       Physical Exam    CONSTITUTIONAL: Awake and alert. Cooperative. Well-developed. Well-nourished. Vitals:    05/23/20 1832 05/23/20 2054 05/23/20 2125 05/23/20 2212   BP: (!) 137/97 (!) 150/76 125/80 (!) 170/76   Pulse: 97 87 87 69   Resp: 18 21 17 16   Temp: 98 °F (36.7 °C)  98 °F (36.7 °C) 98.7 °F (37.1 °C)   TempSrc: Oral  Oral Oral   SpO2: 98% 97% 97% 97%   Weight: 140 lb (63.5 kg)      Height: 5' 4\" (1.626 m)        HENT: Normocephalic. Atraumatic. External ears normal, without discharge. TMs clear bilaterally. No nasal discharge. Oropharynx clear, no erythema. Mucous membranes moist.  EYES: Conjunctiva non-injected, no lid abnormalities noted. No scleral icterus. PERRL. EOM's grossly intact. Anterior chambers clear. NECK: Supple. Normal ROM. No meningismus. No thyroid tenderness or swelling noted. CARDIOVASCULAR: RRR. No [place]. PULMONARY/CHEST WALL: Effort normal. No tachypnea. Lungs clear to ausculation. ABDOMEN: Normal BS. Soft. Nondistended. No tenderness to palpate. No guarding. No hernias noted. No splenomegaly. Back: Spine is midline. No ecchymosis. No crepitus on palpation. No obvious subluxation of vertebral column. No saddle anesthesia or evidence of cauda equina. /ANORECTAL: External hemorrhoid noted, dried red blood present around the rectum. No active bleeding. MUSKULOSKELETAL: Normal ROM. No acute deformities. No edema. No tenderness to palpate. SKIN: Warm and dry. NEUROLOGICAL:  GCS 15. CN II-XII grossly intact. Strength is 5/5 in allextremities and sensation is intact. PSYCHIATRIC: Normal affect, normal insight and judgement. Alert andoriented x 3.         DIAGNOSTIC RESULTS:     LABS:    Results for orders placed or performed during the hospital encounter of 05/23/20   CBC Auto Differential   Result Value Ref Range    WBC 23.8 (H) 4.0 - 11.0 K/uL    RBC 4.60 4.00 - 5.20 M/uL    Hemoglobin 14.7 12.0 - 16.0 g/dL    Hematocrit 43.2 36.0 - 48.0 %    MCV 93.8 80.0 - 100.0 fL    MCH 31.8 26.0 - 34.0 pg    MCHC 33.9 31.0 - 36.0 g/dL    RDW 13.6 12.4 - 15.4 %    Platelets 221 088 - 957 K/uL    MPV 7.9 5.0 - 10.5 fL    PLATELET SLIDE REVIEW Adequate     SLIDE REVIEW see below     Neutrophils % 88.0 %    Lymphocytes % 5.0 %    Monocytes % 5.0 %    Eosinophils % 0.0 %    Basophils % 0.0 %    Neutrophils Absolute 21.4 (H) 1.7 - 7.7 K/uL    Lymphocytes Absolute 1.2 1.0 - 5.1 K/uL    Monocytes Absolute 1.2 0.0 - 1.3 K/uL    Eosinophils Absolute 0.0 0.0 - 0.6 K/uL    Basophils Absolute 0.0 0.0 - 0.2 K/uL    Bands Relative 2 0 - 7 %    RBC Morphology Normal    Comprehensive Metabolic Panel   Result Value Ref Range    Sodium 135 (L) 136 - 145 mmol/L    Potassium 3.7 3.5 - 5.1 mmol/L    Chloride 99 99 - 110 mmol/L    CO2 23 21 - 32 mmol/L    Anion Gap 13 3 - 16    Glucose 126 (H) 70 - 99 mg/dL    BUN 13 7 - 20 mg/dL    CREATININE <0.5 (L) 0.6 - 1.2 mg/dL    GFR Non-African American >60 >60    GFR African American >60 >60    Calcium 9.3 8.3 - 10.6 mg/dL    Total Protein 7.5 6.4 - 8.2 g/dL    Alb 4.4 3.4 - 5.0 g/dL    Albumin/Globulin Ratio 1.4 1.1 - 2.2    Total Bilirubin 1.3 (H) 0.0 - 1.0 mg/dL    Alkaline Phosphatase 77 40 - 129 U/L    ALT 10 10 - 40 U/L    AST 22 15 - 37 U/L    Globulin 3.1 g/dL   Urinalysis   Result Value Ref Range    Color, UA Straw Straw/Yellow    Clarity, UA SL CLOUDY (A) Clear    Glucose, Ur Negative Negative mg/dL    Bilirubin Urine Negative Negative    Ketones, Urine Negative Negative mg/dL    Specific Gravity, UA <=1.005 1.005 - 1.030    Blood, Urine TRACE-INTACT (A) Negative    pH, UA 7.0 5.0 - 8.0    Protein, UA Negative Negative mg/dL    Urobilinogen, Urine 0.2 <2.0 E.U./dL    Nitrite, Urine Negative Negative    Leukocyte Esterase, Urine Negative Negative    Microscopic Examination YES     Urine Type NotGiven    Microscopic Urinalysis   Result Value Ref Range    WBC, UA 0-2 0 - 5 /HPF    RBC, UA 0-2 0 - 4 /HPF    Epithelial Cells, UA 0-1 0 - 5 /HPF    Bacteria, UA Rare (A) None Seen /HPF   Lactic Acid, Plasma   Result Value Ref Range    Lactic Acid 1.2 0.4 - 2.0 mmol/L   Procalcitonin   Result Value Ref Range    Procalcitonin 0.11 0.00 - 0.15 ng/mL         RADIOLOGY:  All x-ray studies are viewed/reviewedby me. Formal interpretations per the radiologist are as follows:      CT ABDOMEN PELVIS W IV CONTRAST Additional Contrast? None   Final Result   Colitis involving the left colon extending from the splenic flexure to the   sigmoid colon. Findings likely inflammatory or infectious in nature.                  EKG:  See EKG interpretation by an attending phsyician      PROCEDURES:   N/A    CRITICAL CARE TIME:   N/A    CONSULTS:  IP CONSULT TO HOSPITALIST  IP CONSULT TO GI      EMERGENCYDEPARTMENT COURSE and DIFFERENTIAL DIAGNOSIS/MDM:   Vitals:    Vitals:    05/23/20 1832 05/23/20 2054 05/23/20 2125 05/23/20 2212   BP: (!) 137/97 (!) 150/76 125/80 (!) 170/76   Pulse: 97 87 87 69   Resp: 18 21 17 16   Temp: 98 °F (36.7 °C)  98 °F (36.7 °C) 98.7 °F (37.1 °C)   TempSrc: Oral  Oral Oral   SpO2: 98% 97% 97% 97%   Weight: 140 lb (63.5 kg)      Height: 5' 4\" (1.626 m)              Patient was evaluated by both myself and Dr. Cristy Sánchez. Patient presented to the emergency room today with complaints of bright red blood per rectum. Patient has a history IBS with diarrhea so she states the diarrhea is not significantly abnormal for her. I did laboratory testing on the patient, patient white count came back at 24,000. Given this finding I did feel that a CT of the abdomen was necessary. CT the abdomen pelvis did show colitis involving the sigmoid and distal colon. Given the patient's age, white blood cell count, CT finding I did feel the patient required admission to the hospital for evaluation of her rectal bleeding as well as her colitis. Patient was started on IV antibiotics. I discussed case the hospitalist who does agree with this plan. He is in agreement with the plan of care. Patient stable upon admission. Patient hemoglobin was stable. Patient laboratory studies, radiographic imaging, andassessment were all discussed with the patient and/or patient family. There was shared decision-making between myself, the attending physician, as well as the patient and/or their surrogate and we are all in agreement with admission. There was an opportunity for questions and all questions were answered to the best of my ability and to the satisfaction of the patient and/or patient family. FINAL IMPRESSION:      1. Infectious colitis    2.  Rectal bleeding          DISPOSITION/PLAN:   DISPOSITIONAdmitted      PATIENT REFERRED TO:  Cameron Aleman  [address] [address]  [phone]            DISCHARGE MEDICATIONS:  Current Discharge Medication List                     (Please note that portions of this note were completed with a voice recognition program.  Efforts were made to edit the dictations, but occasionally words are mis-transcribed.)    CRISTINA Armando CNP-C (electronically signed)        CRISTINA Armando CNP  05/23/20 [id number]

## 2020-05-24 NOTE — ED NOTES
[facility] @ 2034   Lorrie Pollock [facility] returned called @2039       Melinda Ortiz  05/23/20 2041

## 2020-05-24 NOTE — PROGRESS NOTES
Transferred from ER via stretcher without incident. Oriented to room, plan of care and use of call light. Pt ambulated from stretcher to bed without difficulty.

## 2020-05-24 NOTE — H&P
Hospital Medicine History & Physical      PCP: Carmelo Dakin    Date of Admission: 5/23/2020    Date of Service: Pt seen/examined on 5/23/2020 and Admitted to Inpatient with expected LOS greater than two midnights due to medical therapy. Chief Complaint:    Chief Complaint   Patient presents with    Rectal Bleeding     started throwing up with diarrhea friday night and today started having bleeding. States it is pooring out, HX of IBS. History Of Present Illness:    76 y.o. female, with PMH of HTN, HLD, and IBS, who presented to [facility] with nausea, vomiting, diarrhea, and rectal bleeding. History was obtained from the patient review of the EMR. The patient states that she has issues with sensitive stomach, IBS, and chronic intermittent diarrhea. She states that yesterday evening, she began having more profuse diarrhea and started vomiting anything she would try to eat and drink. She does admit to eating a few things that she knows aggravates her stomach. She went to bed as usual and woke up this morning still feeling sick. She continued to have frequent diarrhea overnight and into the morning. This afternoon, she noted that she started passing bright red blood from her rectum which was not mixed with stool. She decided to come into the ED for further evaluation. She states she last had a colonoscopy about 4 years ago and it was normal. She has had some polyps removed in the past. Her gastroenterologist is Dr. Jordan Street. She denies any fever/chills. No known exposure to anyone with Covid-19. She states that started having some mild lower abdominal cramping this evening while at the hospital, otherwise has not had any pain. Denies any chest pain or shortness of breath. Of note, the patient states that she takes a full dose aspirin everyday as a protective mechanism for her heart since she was 28years old. No hx of heart disease. No hx of GI bleed.     The patient denied any other II-XII intact, grossly non-focal.  Psychiatric:  Alert and oriented x4, thought content appropriate, normal insight  Capillary Refill: Brisk,< 3 seconds   Peripheral Pulses: +2 palpable, equal bilaterally       Labs:     Recent Labs     05/23/20 1847   WBC 23.8*   HGB 14.7   HCT 43.2        Recent Labs     05/23/20 1847   *   K 3.7   CL 99   CO2 23   BUN 13   CREATININE <0.5*   CALCIUM 9.3     Recent Labs     05/23/20 1847   AST 22   ALT 10   BILITOT 1.3*   ALKPHOS 77     No results for input(s): INR in the last 72 hours. No results for input(s): Hiren Seip in the last 72 hours. Urinalysis:      Lab Results   Component Value Date    NITRU Negative 05/23/2020    WBCUA 0-2 05/23/2020    BACTERIA Rare 05/23/2020    RBCUA 0-2 05/23/2020    BLOODU TRACE-INTACT 05/23/2020    SPECGRAV <=1.005 05/23/2020    GLUCOSEU Negative 05/23/2020       Radiology:     CXR: I have reviewed the CXR with the following interpretation: No acute cardiopulmonary findings  EKG:  I have reviewed the EKG with the following interpretation: n/a    CT ABDOMEN PELVIS W IV CONTRAST Additional Contrast? None   Final Result   Colitis involving the left colon extending from the splenic flexure to the   sigmoid colon. Findings likely inflammatory or infectious in nature. ASSESSMENT:    Active Hospital Problems    Diagnosis Date Noted    Colitis [K52.9] 05/23/2020    Hypertension [I10]     Hyperlipidemia [E78.5]     Depression [F32.9]     Asthma [J45.909]      PLAN:    Colitis, likely infectious. Symptoms: N/V/D and BRBPR. Hx of IBS. - CT abd/pelvis: Colitis involving the left colon extending from splenic flexure to the sigmoid colon.   - Clear liquid diet, NPO after midnight until GI evaluation  - C.diff antigen, fecal occult stool, and GI bacterial pathogens PCR pending  - Cipro and Flagyl started in ED, continued for now pending culture  - Will trend H/H given BRBPR  - MIVFs  - GI consulted, thank you for

## 2020-05-24 NOTE — PROGRESS NOTES
Patient consent signed for colonoscopy tomorrow. No prep to be done due to bloody stools. On clear liquid diet at present. Reports abdominal cramping presently.

## 2020-05-24 NOTE — PROGRESS NOTES
Hospitalist Progress Note      PCP: Gorge Bradley    Date of Admission: 5/23/2020    Chief Complaint: Abdominal Pain    Subjective: no new c/o. Medications:  Reviewed    Infusion Medications    sodium chloride 100 mL/hr at 05/24/20 0935     Scheduled Medications    amLODIPine  2.5 mg Oral Daily    dicyclomine  20 mg Oral BID    budesonide-formoterol  2 puff Inhalation BID    atorvastatin  10 mg Oral Nightly    venlafaxine  75 mg Oral Daily    sodium chloride flush  10 mL Intravenous 2 times per day    ciprofloxacin  400 mg Intravenous Q12H    metroNIDAZOLE  500 mg Intravenous Q8H    pantoprazole  40 mg Oral QAM AC     PRN Meds: sodium chloride flush, potassium chloride **OR** potassium alternative oral replacement **OR** potassium chloride, acetaminophen, ondansetron      Intake/Output Summary (Last 24 hours) at 5/24/2020 1254  Last data filed at 5/24/2020 1248  Gross per 24 hour   Intake 0 ml   Output 750 ml   Net -750 ml       Physical Exam Performed:    /71   Pulse 72   Temp 99.1 °F (37.3 °C) (Oral)   Resp 16   Ht 5' 4\" (1.626 m)   Wt 140 lb (63.5 kg)   SpO2 94%   BMI 24.03 kg/m²     General appearance: No apparent distress, appears stated age and cooperative. HEENT: Pupils equal, round, and reactive to light. Conjunctivae/corneas clear. Neck: Supple, with full range of motion. No jugular venous distention. Trachea midline. Respiratory:  Normal respiratory effort. Clear to auscultation, bilaterally without Rales/Wheezes/Rhonchi. Cardiovascular: Regular rate and rhythm with normal S1/S2 without murmurs, rubs or gallops. Abdomen: Soft, non-tender, non-distended with normal bowel sounds. Musculoskeletal: No clubbing, cyanosis or edema bilaterally. Full range of motion without deformity. Skin: Skin color, texture, turgor normal.  No rashes or lesions. Neurologic:  Neurovascularly intact without any focal sensory/motor deficits.  Cranial nerves: II-XII intact, grossly non-focal.  Psychiatric: Alert and oriented, thought content appropriate, normal insight  Capillary Refill: Brisk,< 3 seconds   Peripheral Pulses: +2 palpable, equal bilaterally       Labs:   Recent Labs     05/23/20 1847 05/23/20  2338 05/24/20  0718   WBC 23.8*  --  18.6*   HGB 14.7 14.1 13.3   HCT 43.2 43.4 40.5     --  339     Recent Labs     05/23/20 1847 05/24/20  0718   * 138   K 3.7 3.6   CL 99 102   CO2 23 27   BUN 13 10   CREATININE <0.5* 0.6   CALCIUM 9.3 8.8     Recent Labs     05/23/20 1847 05/24/20  0718   AST 22 14*   ALT 10 8*   BILITOT 1.3* 1.3*   ALKPHOS 77 62     No results for input(s): INR in the last 72 hours. No results for input(s): Launie Jerome in the last 72 hours. Urinalysis:      Lab Results   Component Value Date    NITRU Negative 05/23/2020    WBCUA 0-2 05/23/2020    BACTERIA Rare 05/23/2020    RBCUA 0-2 05/23/2020    BLOODU TRACE-INTACT 05/23/2020    SPECGRAV <=1.005 05/23/2020    GLUCOSEU Negative 05/23/2020       Consults:    IP CONSULT TO HOSPITALIST  IP CONSULT TO GI      Assessment/Plan:    Active Hospital Problems    Diagnosis    Colitis [K52.9]    Hypertension [I10]    Hyperlipidemia [E78.5]    Depression [F32.9]    Asthma [J45.909]       Colitis - likely acute bacterial, started on Empiric IV Cipro/Flagyl pending stool studies. Possible ischemic colitis on IVF. GI consulted and appreciated, to continue clear liquids w/ plan for colonoscopy Monday 25 May. Sepsis - w/ Leukocytosis/Tachycardia POArrival, likely 2nd to above but w/out Fever/Elevated Lactate. Continue IVF as appropriate and monitor clinical response w/ ABX as written - Leukocytosis improving. HTN - w/out known CAD and no evidence of active signs/sxs of ischemia/failure. Currently controlled on home meds w/ vitals reviewed and documented as above. HyperLipidemia - controlled on home Statin.  Continue, w/ f/u and med adjustment w/ PCP    Asthma - w/out acute exacerbation,

## 2020-05-25 LAB
ALBUMIN SERPL-MCNC: 3.6 G/DL (ref 3.4–5)
ANION GAP SERPL CALCULATED.3IONS-SCNC: 8 MMOL/L (ref 3–16)
BUN BLDV-MCNC: 6 MG/DL (ref 7–20)
CALCIUM SERPL-MCNC: 8.8 MG/DL (ref 8.3–10.6)
CHLORIDE BLD-SCNC: 106 MMOL/L (ref 99–110)
CO2: 27 MMOL/L (ref 21–32)
CREAT SERPL-MCNC: 0.6 MG/DL (ref 0.6–1.2)
GFR AFRICAN AMERICAN: >60
GFR NON-AFRICAN AMERICAN: >60
GI BACTERIAL PATHOGENS BY PCR: NORMAL
GLUCOSE BLD-MCNC: 123 MG/DL (ref 70–99)
HCT VFR BLD CALC: 38 % (ref 36–48)
HEMOGLOBIN: 12.8 G/DL (ref 12–16)
MAGNESIUM: 2 MG/DL (ref 1.8–2.4)
MCH RBC QN AUTO: 32 PG (ref 26–34)
MCHC RBC AUTO-ENTMCNC: 33.8 G/DL (ref 31–36)
MCV RBC AUTO: 94.8 FL (ref 80–100)
PDW BLD-RTO: 13.5 % (ref 12.4–15.4)
PHOSPHORUS: 2.2 MG/DL (ref 2.5–4.9)
PLATELET # BLD: 301 K/UL (ref 135–450)
PMV BLD AUTO: 7.7 FL (ref 5–10.5)
POTASSIUM SERPL-SCNC: 4 MMOL/L (ref 3.5–5.1)
RBC # BLD: 4.01 M/UL (ref 4–5.2)
SODIUM BLD-SCNC: 141 MMOL/L (ref 136–145)
WBC # BLD: 15.6 K/UL (ref 4–11)

## 2020-05-25 PROCEDURE — 1200000000 HC SEMI PRIVATE

## 2020-05-25 PROCEDURE — 99152 MOD SED SAME PHYS/QHP 5/>YRS: CPT | Performed by: INTERNAL MEDICINE

## 2020-05-25 PROCEDURE — 6360000002 HC RX W HCPCS: Performed by: INTERNAL MEDICINE

## 2020-05-25 PROCEDURE — 36415 COLL VENOUS BLD VENIPUNCTURE: CPT

## 2020-05-25 PROCEDURE — 85027 COMPLETE CBC AUTOMATED: CPT

## 2020-05-25 PROCEDURE — 83735 ASSAY OF MAGNESIUM: CPT

## 2020-05-25 PROCEDURE — 0DBM8ZX EXCISION OF DESCENDING COLON, VIA NATURAL OR ARTIFICIAL OPENING ENDOSCOPIC, DIAGNOSTIC: ICD-10-PCS | Performed by: INTERNAL MEDICINE

## 2020-05-25 PROCEDURE — 99153 MOD SED SAME PHYS/QHP EA: CPT | Performed by: INTERNAL MEDICINE

## 2020-05-25 PROCEDURE — 7100000010 HC PHASE II RECOVERY - FIRST 15 MIN: Performed by: INTERNAL MEDICINE

## 2020-05-25 PROCEDURE — 94761 N-INVAS EAR/PLS OXIMETRY MLT: CPT

## 2020-05-25 PROCEDURE — 2700000000 HC OXYGEN THERAPY PER DAY

## 2020-05-25 PROCEDURE — 88342 IMHCHEM/IMCYTCHM 1ST ANTB: CPT

## 2020-05-25 PROCEDURE — 3609010300 HC COLONOSCOPY W/BIOPSY SINGLE/MULTIPLE: Performed by: INTERNAL MEDICINE

## 2020-05-25 PROCEDURE — 6370000000 HC RX 637 (ALT 250 FOR IP): Performed by: NURSE PRACTITIONER

## 2020-05-25 PROCEDURE — 6360000002 HC RX W HCPCS: Performed by: NURSE PRACTITIONER

## 2020-05-25 PROCEDURE — 2709999900 HC NON-CHARGEABLE SUPPLY: Performed by: INTERNAL MEDICINE

## 2020-05-25 PROCEDURE — 7100000011 HC PHASE II RECOVERY - ADDTL 15 MIN: Performed by: INTERNAL MEDICINE

## 2020-05-25 PROCEDURE — 2500000003 HC RX 250 WO HCPCS: Performed by: NURSE PRACTITIONER

## 2020-05-25 PROCEDURE — 80069 RENAL FUNCTION PANEL: CPT

## 2020-05-25 PROCEDURE — 94640 AIRWAY INHALATION TREATMENT: CPT

## 2020-05-25 PROCEDURE — 2580000003 HC RX 258: Performed by: NURSE PRACTITIONER

## 2020-05-25 PROCEDURE — 88305 TISSUE EXAM BY PATHOLOGIST: CPT

## 2020-05-25 RX ORDER — FENTANYL CITRATE 50 UG/ML
INJECTION, SOLUTION INTRAMUSCULAR; INTRAVENOUS PRN
Status: DISCONTINUED | OUTPATIENT
Start: 2020-05-25 | End: 2020-05-25 | Stop reason: ALTCHOICE

## 2020-05-25 RX ORDER — MIDAZOLAM HYDROCHLORIDE 5 MG/ML
INJECTION INTRAMUSCULAR; INTRAVENOUS PRN
Status: DISCONTINUED | OUTPATIENT
Start: 2020-05-25 | End: 2020-05-25 | Stop reason: ALTCHOICE

## 2020-05-25 RX ADMIN — METRONIDAZOLE 500 MG: 500 INJECTION, SOLUTION INTRAVENOUS at 22:43

## 2020-05-25 RX ADMIN — CIPROFLOXACIN 400 MG: 2 INJECTION, SOLUTION INTRAVENOUS at 23:45

## 2020-05-25 RX ADMIN — DICYCLOMINE HYDROCHLORIDE 20 MG: 20 TABLET ORAL at 21:27

## 2020-05-25 RX ADMIN — SODIUM CHLORIDE: 9 INJECTION, SOLUTION INTRAVENOUS at 19:24

## 2020-05-25 RX ADMIN — Medication 10 ML: at 12:48

## 2020-05-25 RX ADMIN — Medication 2 PUFF: at 08:06

## 2020-05-25 RX ADMIN — Medication 2 PUFF: at 19:26

## 2020-05-25 RX ADMIN — CIPROFLOXACIN 400 MG: 2 INJECTION, SOLUTION INTRAVENOUS at 12:48

## 2020-05-25 RX ADMIN — METRONIDAZOLE 500 MG: 500 INJECTION, SOLUTION INTRAVENOUS at 15:37

## 2020-05-25 RX ADMIN — METRONIDAZOLE 500 MG: 500 INJECTION, SOLUTION INTRAVENOUS at 06:29

## 2020-05-25 RX ADMIN — ATORVASTATIN CALCIUM 10 MG: 10 TABLET, FILM COATED ORAL at 21:27

## 2020-05-25 ASSESSMENT — PAIN - FUNCTIONAL ASSESSMENT: PAIN_FUNCTIONAL_ASSESSMENT: 0-10

## 2020-05-25 NOTE — OP NOTE
Colonoscopy Note    Patient:   Elie Weinberg    YOB: 1945    Facility:   [facility] [Inpatient]  Referring/PCP: Felton Abbasi  Procedure:   Colonoscopy with biopsy  Date:     5/25/2020  Endoscopist:  Shaylee Stein MD    Preoperative Diagnosis:  68yo F with h/o HTN and IBS is admitted with nausea, emesis and bloody diarrhea in the setting of leukocytosis and colonic wall thickening suspicious for infectious colitis.  The watershed distribution of inflammation raises the possibility of ischemia    Postoperative Diagnosis:  colitis    Anesthesia: Versed 9 mg IV, fentanyl 100 mcg IV    Estimated blood loss: < 5 ml    Complications:  None    Description of Procedure:  Informed consent was obtained from the patient after explanation of the procedure including indications, description of the procedure,  benefits and possible risks and complications of the procedure, and alternatives. Questions were answered. The patient's history was reviewed and a directed physical examination was performed prior to the procedure. Patient was monitored throughout the procedure with pulse oximetry and periodic assessment of vital signs. Patient was sedated as noted above. The Nursing staff and I performed a time out. With the patient initially in the left lateral decubitus position, a digital rectal examination was performed and revealed negative without mass, lesions or tenderness. The Olympus video colonoscope was placed in the patient's rectum and advanced without difficulty  to the descending colon. Photographs were taken. The prep was inadequate. Examination of the mucosa was performed during both introduction and withdrawal of the colonoscope. Retroflexed view of the rectum was performed. Findings:     1. Diffuse superficial ulcerations were noted in the descending colon (biopsied)  2.  Patchy erythema in the sigmoid     Recommendations:  Suspect ischemic colitis  - full

## 2020-05-25 NOTE — PLAN OF CARE
Problem: Falls - Risk of:  Goal: Will remain free from falls  Description: Will remain free from falls  Outcome: Ongoing  Goal: Absence of physical injury  Description: Absence of physical injury  Outcome: Ongoing     Problem: Falls - Risk of: Intervention: Assess risk factors for falls  Note: Pt is high fall risk  Intervention: Postfall assessment  Note: No falls sustained this shift thus far  Intervention: Manage a safe environment  Note: Call light within reach. Bed side table within reach. Wheels locked. Bed in lowest position. Refused bed alarm; independent with ADLs. Pt instructed to call out for assistance. Pt expressed understanding & calls out appropriately.

## 2020-05-25 NOTE — PROGRESS NOTES
Report called to Bobo Foley RN post colonoscopy as transport was leaving with patient. Patient tolerated procedure well. VSS.    Lionel Carrel, RN

## 2020-05-25 NOTE — PROGRESS NOTES
Pt returned from procedure. A&O, talkative and pleasant. VSS. Denies pain/discomfort. Denies dyspnea. Denies N/V; passing flatus; BS active x4. Tele in place. Call light within reach. Bed side table within reach. Wheels locked. Bed in lowest position. Refused bed alarm; independent with ADLs. Pt instructed to call out for assistance. Pt expressed understanding & calls out appropriately. Shift assessment complete. All care cont per orders. Will cont to monitor.  Electronically signed by Elan Coello RN on 5/25/2020 at 3:12 PM

## 2020-05-25 NOTE — H&P
Amoxicillin Hives    Dicloxacillin Hives    Gluten Meal Diarrhea    Hexachlorophene      Phisohex - rash    Lactose     Pcn [Penicillins] Hives           Physical Exam:  Vital Signs: BP (!) 145/73   Pulse 65   Temp 98.2 °F (36.8 °C) (Temporal)   Resp 16   Ht 5' 4\" (1.626 m)   Wt 140 lb (63.5 kg)   SpO2 97%   BMI 24.03 kg/m²  Body mass index is 24.03 kg/m². Airway:Normal  Cardiac:Normal  Pulmonary:Normal  Abdomen:Normal  Specific to procedure: Mallampati 3      Pre-Procedure Assessment/Plan:  ASA 3 - Patient with moderate systemic disease with functional limitations    Level of Sedation Plan: Moderate sedation    Post Procedure plan: Return to same level of care    I assessed the patient and find that the patient is in satisfactory condition to proceed with the planned procedure and sedation plan. I have explained the risk, benefits, and alternatives to the procedure. The patient understands and agrees to proceed.   Yes    Chaz Turner  10:40 AM 5/25/2020

## 2020-05-26 VITALS
TEMPERATURE: 98.1 F | HEART RATE: 66 BPM | BODY MASS INDEX: 23.9 KG/M2 | SYSTOLIC BLOOD PRESSURE: 173 MMHG | RESPIRATION RATE: 18 BRPM | OXYGEN SATURATION: 96 % | DIASTOLIC BLOOD PRESSURE: 80 MMHG | WEIGHT: 140 LBS | HEIGHT: 64 IN

## 2020-05-26 LAB
ALBUMIN SERPL-MCNC: 3.7 G/DL (ref 3.4–5)
ANION GAP SERPL CALCULATED.3IONS-SCNC: 10 MMOL/L (ref 3–16)
BUN BLDV-MCNC: 6 MG/DL (ref 7–20)
CALCIUM SERPL-MCNC: 8.8 MG/DL (ref 8.3–10.6)
CHLORIDE BLD-SCNC: 106 MMOL/L (ref 99–110)
CO2: 25 MMOL/L (ref 21–32)
CREAT SERPL-MCNC: <0.5 MG/DL (ref 0.6–1.2)
GFR AFRICAN AMERICAN: >60
GFR NON-AFRICAN AMERICAN: >60
GLUCOSE BLD-MCNC: 110 MG/DL (ref 70–99)
HCT VFR BLD CALC: 38.8 % (ref 36–48)
HEMOGLOBIN: 12.8 G/DL (ref 12–16)
MCH RBC QN AUTO: 31.1 PG (ref 26–34)
MCHC RBC AUTO-ENTMCNC: 33 G/DL (ref 31–36)
MCV RBC AUTO: 94.4 FL (ref 80–100)
PDW BLD-RTO: 13.8 % (ref 12.4–15.4)
PHOSPHORUS: 2.4 MG/DL (ref 2.5–4.9)
PLATELET # BLD: 300 K/UL (ref 135–450)
PMV BLD AUTO: 8 FL (ref 5–10.5)
POTASSIUM SERPL-SCNC: 3.6 MMOL/L (ref 3.5–5.1)
RBC # BLD: 4.11 M/UL (ref 4–5.2)
SODIUM BLD-SCNC: 141 MMOL/L (ref 136–145)
WBC # BLD: 13.5 K/UL (ref 4–11)

## 2020-05-26 PROCEDURE — 2580000003 HC RX 258: Performed by: INTERNAL MEDICINE

## 2020-05-26 PROCEDURE — 2500000003 HC RX 250 WO HCPCS: Performed by: INTERNAL MEDICINE

## 2020-05-26 PROCEDURE — 2580000003 HC RX 258: Performed by: NURSE PRACTITIONER

## 2020-05-26 PROCEDURE — 80069 RENAL FUNCTION PANEL: CPT

## 2020-05-26 PROCEDURE — 94640 AIRWAY INHALATION TREATMENT: CPT

## 2020-05-26 PROCEDURE — 85027 COMPLETE CBC AUTOMATED: CPT

## 2020-05-26 PROCEDURE — 2500000003 HC RX 250 WO HCPCS: Performed by: NURSE PRACTITIONER

## 2020-05-26 PROCEDURE — 36415 COLL VENOUS BLD VENIPUNCTURE: CPT

## 2020-05-26 PROCEDURE — 6370000000 HC RX 637 (ALT 250 FOR IP): Performed by: NURSE PRACTITIONER

## 2020-05-26 RX ORDER — CIPROFLOXACIN 250 MG/1
250 TABLET, FILM COATED ORAL 2 TIMES DAILY
Qty: 10 TABLET | Refills: 0 | Status: SHIPPED | OUTPATIENT
Start: 2020-05-26 | End: 2020-05-26 | Stop reason: HOSPADM

## 2020-05-26 RX ORDER — METRONIDAZOLE 250 MG/1
250 TABLET ORAL 3 TIMES DAILY
Qty: 15 TABLET | Refills: 0 | Status: SHIPPED | OUTPATIENT
Start: 2020-05-26 | End: 2020-05-26 | Stop reason: HOSPADM

## 2020-05-26 RX ADMIN — VENLAFAXINE HYDROCHLORIDE 75 MG: 37.5 CAPSULE, EXTENDED RELEASE ORAL at 09:27

## 2020-05-26 RX ADMIN — SODIUM CHLORIDE: 9 INJECTION, SOLUTION INTRAVENOUS at 06:08

## 2020-05-26 RX ADMIN — PANTOPRAZOLE SODIUM 40 MG: 40 TABLET, DELAYED RELEASE ORAL at 06:09

## 2020-05-26 RX ADMIN — DICYCLOMINE HYDROCHLORIDE 20 MG: 20 TABLET ORAL at 09:27

## 2020-05-26 RX ADMIN — Medication 2 PUFF: at 08:26

## 2020-05-26 RX ADMIN — METRONIDAZOLE 500 MG: 500 INJECTION, SOLUTION INTRAVENOUS at 06:08

## 2020-05-26 RX ADMIN — POTASSIUM PHOSPHATE, MONOBASIC AND POTASSIUM PHOSPHATE, DIBASIC 20 MMOL: 224; 236 INJECTION, SOLUTION, CONCENTRATE INTRAVENOUS at 09:27

## 2020-05-26 RX ADMIN — AMLODIPINE BESYLATE 2.5 MG: 2.5 TABLET ORAL at 09:27

## 2020-05-26 NOTE — PROGRESS NOTES
GI Progress Note      SUBJECTIVE:  Denies nausea, emesis, abd pain. BMs this am w/o blood. Tolerating low fiber diet.     OBJECTIVE      Medications    Current Facility-Administered Medications: potassium phosphate 20 mmol in dextrose 5 % 250 mL IVPB, 20 mmol, Intravenous, Once  amLODIPine (NORVASC) tablet 2.5 mg, 2.5 mg, Oral, Daily  dicyclomine (BENTYL) tablet 20 mg, 20 mg, Oral, BID  budesonide-formoterol (SYMBICORT) 160-4.5 MCG/ACT inhaler 2 puff, 2 puff, Inhalation, BID  atorvastatin (LIPITOR) tablet 10 mg, 10 mg, Oral, Nightly  venlafaxine (EFFEXOR XR) extended release capsule 75 mg, 75 mg, Oral, Daily  sodium chloride flush 0.9 % injection 10 mL, 10 mL, Intravenous, 2 times per day  sodium chloride flush 0.9 % injection 10 mL, 10 mL, Intravenous, PRN  acetaminophen (TYLENOL) tablet 650 mg, 650 mg, Oral, Q4H PRN  ondansetron (ZOFRAN) injection 4 mg, 4 mg, Intravenous, Q6H PRN  ciprofloxacin (CIPRO) IVPB 400 mg, 400 mg, Intravenous, Q12H  metronidazole (FLAGYL) 500 mg in NaCl 100 mL IVPB premix, 500 mg, Intravenous, Q8H  pantoprazole (PROTONIX) tablet 40 mg, 40 mg, Oral, QAM AC  Physical    VITALS:  BP (!) 173/80   Pulse 66   Temp 98.1 °F (36.7 °C) (Oral)   Resp 18   Ht 5' 4\" (1.626 m)   Wt 140 lb (63.5 kg)   SpO2 96%   BMI 24.03 kg/m²   ABD: soft, NT,ND, NABs  Data    CBC with Differential:    Lab Results   Component Value Date    WBC 13.5 05/26/2020    RBC 4.11 05/26/2020    HGB 12.8 05/26/2020    HCT 38.8 05/26/2020     05/26/2020    MCV 94.4 05/26/2020    MCH 31.1 05/26/2020    MCHC 33.0 05/26/2020    RDW 13.8 05/26/2020    BANDSPCT 2 05/23/2020    LYMPHOPCT 10.4 05/24/2020    MONOPCT 9.5 05/24/2020    BASOPCT 0.3 05/24/2020    MONOSABS 1.8 05/24/2020    LYMPHSABS 1.9 05/24/2020    EOSABS 0.1 05/24/2020    BASOSABS 0.1 05/24/2020     BMP:    Lab Results   Component Value Date     05/26/2020    K 3.6 05/26/2020    K 3.6 05/24/2020     05/26/2020    CO2 25 05/26/2020    BUN 6

## 2020-05-26 NOTE — PROGRESS NOTES
Pt discharged via private vehicle in stable condition. All d/c instructions and f/u instructions given. Pt states understanding.

## 2020-05-27 NOTE — DISCHARGE SUMMARY
12.8 05/26/2020    HCT 38.8 05/26/2020     05/26/2020       Renal:    Lab Results   Component Value Date     05/26/2020    K 3.6 05/26/2020    K 3.6 05/24/2020     05/26/2020    CO2 25 05/26/2020    BUN 6 05/26/2020    CREATININE <0.5 05/26/2020    CALCIUM 8.8 05/26/2020    PHOS 2.4 05/26/2020         Significant Diagnostic Studies    Radiology:   CT ABDOMEN PELVIS W IV CONTRAST Additional Contrast? None   Final Result   Colitis involving the left colon extending from the splenic flexure to the   sigmoid colon. Findings likely inflammatory or infectious in nature. Consults:     IP CONSULT TO HOSPITALIST  IP CONSULT TO GI    Disposition: home     Condition at Discharge: Stable    Discharge Instructions/Follow-up:  w/ PCP 1-2 weeks and subspecialists as arranged.      Code Status:  Full Code    Activity: activity as tolerated    Diet: regular diet      Discharge Medications:     Discharge Medication List as of 5/26/2020  3:21 PM           Details   Calcium Carbonate-Vitamin D 500-400 MG-UNIT TABS Take by mouthHistorical Med      dicyclomine (BENTYL) 20 MG tablet TK 1 T PO BID, R-0Historical Med      fluticasone-salmeterol (ADVAIR HFA) 230-21 MCG/ACT inhaler Inhale 2 puffs into the lungs 2 times daily      aspirin 325 MG tablet Take 325 mg by mouth daily      venlafaxine (EFFEXOR XR) 75 MG extended release capsule Take 75 mg by mouth daily      omeprazole (PRILOSEC) 20 MG delayed release capsule Take 40 mg by mouth daily      valACYclovir (VALTREX) 500 MG tablet Take 1,000 mg by mouth 2 times daily      amLODIPine (NORVASC) 2.5 MG tablet Take 2.5 mg by mouth daily      ClonazePAM (KLONOPIN PO) Take 0.5 mg by mouth 3 times daily       Simvastatin (ZOCOR PO) Take 20 mg by mouth nightly       Multiple Vitamins-Minerals (THERAPEUTIC MULTIVITAMIN-MINERALS) tablet Take 1 tablet by mouth daily             Time Spent on discharge is more than 30 minutes in the examination, evaluation, counseling and review of medications and discharge plan. Signed:    Josué Quach MD   5/27/2020      Thank you Jaimie Nolan for the opportunity to be involved in this patient's care. If you have any questions or concerns please feel free to contact me at [phone].

## 2021-12-13 ENCOUNTER — HOSPITAL ENCOUNTER (OUTPATIENT)
Dept: MAMMOGRAPHY | Age: 76
Discharge: HOME | End: 2021-12-13
Payer: MEDICARE

## 2021-12-13 VITALS — WEIGHT: 145 LBS | HEIGHT: 61 IN | BODY MASS INDEX: 27.38 KG/M2

## 2021-12-13 DIAGNOSIS — Z12.31 VISIT FOR SCREENING MAMMOGRAM: ICD-10-CM

## 2021-12-13 PROCEDURE — 77063 BREAST TOMOSYNTHESIS BI: CPT

## 2022-01-03 ENCOUNTER — HOSPITAL ENCOUNTER (OUTPATIENT)
Age: 77
Discharge: HOME | End: 2022-01-03
Payer: MEDICARE

## 2022-01-03 PROCEDURE — U0005 INFEC AGEN DETEC AMPLI PROBE: HCPCS

## 2022-01-03 PROCEDURE — U0003 INFECTIOUS AGENT DETECTION BY NUCLEIC ACID (DNA OR RNA); SEVERE ACUTE RESPIRATORY SYNDROME CORONAVIRUS 2 (SARS-COV-2) (CORONAVIRUS DISEASE [COVID-19]), AMPLIFIED PROBE TECHNIQUE, MAKING USE OF HIGH THROUGHPUT TECHNOLOGIES AS DESCRIBED BY CMS-2020-01-R: HCPCS

## 2022-01-03 RX ORDER — AMLODIPINE BESYLATE 10 MG/1
10 TABLET ORAL DAILY
COMMUNITY

## 2022-01-03 RX ORDER — OMEPRAZOLE 40 MG/1
40 CAPSULE, DELAYED RELEASE ORAL 2 TIMES DAILY
COMMUNITY

## 2022-01-04 LAB — SARS-COV-2: NOT DETECTED

## 2022-01-05 ENCOUNTER — HOSPITAL ENCOUNTER (OUTPATIENT)
Age: 77
Setting detail: OUTPATIENT SURGERY
Discharge: HOME | End: 2022-01-05
Attending: INTERNAL MEDICINE | Admitting: INTERNAL MEDICINE
Payer: MEDICARE

## 2022-01-05 ENCOUNTER — ANESTHESIA (OUTPATIENT)
Dept: ENDOSCOPY | Age: 77
End: 2022-01-05
Payer: MEDICARE

## 2022-01-05 ENCOUNTER — ANESTHESIA EVENT (OUTPATIENT)
Dept: ENDOSCOPY | Age: 77
End: 2022-01-05
Payer: MEDICARE

## 2022-01-05 VITALS — OXYGEN SATURATION: 98 % | DIASTOLIC BLOOD PRESSURE: 164 MMHG | SYSTOLIC BLOOD PRESSURE: 192 MMHG

## 2022-01-05 VITALS
TEMPERATURE: 97.5 F | OXYGEN SATURATION: 95 % | HEIGHT: 62 IN | BODY MASS INDEX: 25.76 KG/M2 | SYSTOLIC BLOOD PRESSURE: 143 MMHG | DIASTOLIC BLOOD PRESSURE: 75 MMHG | WEIGHT: 140 LBS | HEART RATE: 75 BPM | RESPIRATION RATE: 16 BRPM

## 2022-01-05 DIAGNOSIS — K92.1 BLACK STOOL: ICD-10-CM

## 2022-01-05 PROCEDURE — 88312 SPECIAL STAINS GROUP 1: CPT

## 2022-01-05 PROCEDURE — 3609012400 HC EGD TRANSORAL BIOPSY SINGLE/MULTIPLE: Performed by: INTERNAL MEDICINE

## 2022-01-05 PROCEDURE — 2580000003 HC RX 258: Performed by: INTERNAL MEDICINE

## 2022-01-05 PROCEDURE — 7100000011 HC PHASE II RECOVERY - ADDTL 15 MIN: Performed by: INTERNAL MEDICINE

## 2022-01-05 PROCEDURE — 2709999900 HC NON-CHARGEABLE SUPPLY: Performed by: INTERNAL MEDICINE

## 2022-01-05 PROCEDURE — 7100000010 HC PHASE II RECOVERY - FIRST 15 MIN: Performed by: INTERNAL MEDICINE

## 2022-01-05 PROCEDURE — 88305 TISSUE EXAM BY PATHOLOGIST: CPT

## 2022-01-05 PROCEDURE — 6360000002 HC RX W HCPCS: Performed by: NURSE ANESTHETIST, CERTIFIED REGISTERED

## 2022-01-05 PROCEDURE — 3700000001 HC ADD 15 MINUTES (ANESTHESIA): Performed by: INTERNAL MEDICINE

## 2022-01-05 PROCEDURE — 3609027000 HC COLONOSCOPY: Performed by: INTERNAL MEDICINE

## 2022-01-05 PROCEDURE — 2500000003 HC RX 250 WO HCPCS: Performed by: NURSE ANESTHETIST, CERTIFIED REGISTERED

## 2022-01-05 PROCEDURE — 3700000000 HC ANESTHESIA ATTENDED CARE: Performed by: INTERNAL MEDICINE

## 2022-01-05 RX ORDER — LIDOCAINE HYDROCHLORIDE 10 MG/ML
0.1 INJECTION, SOLUTION INFILTRATION; PERINEURAL ONCE
Status: DISCONTINUED | OUTPATIENT
Start: 2022-01-05 | End: 2022-01-05 | Stop reason: HOSPADM

## 2022-01-05 RX ORDER — LIDOCAINE HYDROCHLORIDE 20 MG/ML
INJECTION, SOLUTION INFILTRATION; PERINEURAL PRN
Status: DISCONTINUED | OUTPATIENT
Start: 2022-01-05 | End: 2022-01-05 | Stop reason: SDUPTHER

## 2022-01-05 RX ORDER — PROPOFOL 10 MG/ML
INJECTION, EMULSION INTRAVENOUS PRN
Status: DISCONTINUED | OUTPATIENT
Start: 2022-01-05 | End: 2022-01-05 | Stop reason: SDUPTHER

## 2022-01-05 RX ORDER — SODIUM CHLORIDE, SODIUM LACTATE, POTASSIUM CHLORIDE, CALCIUM CHLORIDE 600; 310; 30; 20 MG/100ML; MG/100ML; MG/100ML; MG/100ML
INJECTION, SOLUTION INTRAVENOUS CONTINUOUS
Status: DISCONTINUED | OUTPATIENT
Start: 2022-01-05 | End: 2022-01-05 | Stop reason: HOSPADM

## 2022-01-05 RX ADMIN — LIDOCAINE HYDROCHLORIDE 80 MG: 20 INJECTION, SOLUTION INFILTRATION; PERINEURAL at 14:38

## 2022-01-05 RX ADMIN — PROPOFOL 50 MG: 10 INJECTION, EMULSION INTRAVENOUS at 14:42

## 2022-01-05 RX ADMIN — SODIUM CHLORIDE, POTASSIUM CHLORIDE, SODIUM LACTATE AND CALCIUM CHLORIDE: 600; 310; 30; 20 INJECTION, SOLUTION INTRAVENOUS at 14:15

## 2022-01-05 RX ADMIN — PROPOFOL 100 MG: 10 INJECTION, EMULSION INTRAVENOUS at 14:38

## 2022-01-05 ASSESSMENT — PAIN SCALES - GENERAL
PAINLEVEL_OUTOF10: 0

## 2022-01-05 ASSESSMENT — PAIN - FUNCTIONAL ASSESSMENT: PAIN_FUNCTIONAL_ASSESSMENT: 0-10

## 2022-01-05 NOTE — H&P
Pre-sedation Assessment    History and Physical / Pre-Sedation Assessment  Patient:  Divya March   :   1945     Intended Procedure: EGD/CLS      HPI: 76yo F with h/o ischemic colitis () who presents for evaluation of rectal bleeding and dark stools. Hgb 14.4 earlier this week. ASA was last used a few weeks ago. Omeprazole is consumed in treatment of acid reflux. She notes occ pyrosis, epigastric discomfort and intermittent dysphagia to solids. Past Medical History:   Diagnosis Date    Asthma     Back pain     Chronic pain     hip    Compression fracture     fourth lumbar vertebra with delayed healing    DDD (degenerative disc disease), cervical     Depression     Hyperlipidemia     Hypertension     Lumbar stenosis     Osteopenia     Osteoporosis     PONV (postoperative nausea and vomiting)     Sciatica     Seasonal allergies      No current facility-administered medications on file prior to encounter. Current Outpatient Medications on File Prior to Encounter   Medication Sig Dispense Refill    amLODIPine (NORVASC) 10 MG tablet Take 10 mg by mouth daily      omeprazole (PRILOSEC) 40 MG delayed release capsule Take 40 mg by mouth 2 times daily      venlafaxine (EFFEXOR XR) 75 MG extended release capsule Take 75 mg by mouth daily      ClonazePAM (KLONOPIN PO) Take 0.5 mg by mouth 3 times daily       Simvastatin (ZOCOR PO) Take 20 mg by mouth nightly       Multiple Vitamins-Minerals (THERAPEUTIC MULTIVITAMIN-MINERALS) tablet Take 1 tablet by mouth daily      Calcium Carbonate-Vitamin D 500-400 MG-UNIT TABS Take by mouth      fluticasone-salmeterol (ADVAIR HFA) 230-21 MCG/ACT inhaler Inhale 2 puffs into the lungs 2 times daily         Nurses notes reviewed and agreed. Medications reviewed  Allergies:    Allergies   Allergen Reactions    Amoxicillin Hives    Dicloxacillin Hives    Gluten Meal Diarrhea    Hexachlorophene      Phisohex - rash    Lactose     Pcn [Penicillins] Hives           Physical Exam:  Vital Signs: BP (!) 149/80   Pulse 77   Temp 97.5 °F (36.4 °C) (Temporal)   Resp 16   Ht 5' 1.5\" (1.562 m)   Wt 140 lb (63.5 kg)   SpO2 97%   BMI 26.02 kg/m²  Body mass index is 26.02 kg/m². Airway:Normal  Cardiac:Normal  Pulmonary:Normal  Abdomen:Normal  Specific to procedure: Mallampati 3      Pre-Procedure Assessment/Plan:  ASA 2 - Patient with mild systemic disease with no functional limitations    Level of Sedation Plan:Deep sedation    Post Procedure plan: Return to same level of care    I assessed the patient and find that the patient is in satisfactory condition to proceed with the planned procedure and sedation plan. I have explained the risk, benefits, and alternatives to the procedure. The patient understands and agrees to proceed.   Yes    Kiran Govea MD  2:26 PM 1/5/2022

## 2022-01-05 NOTE — PROGRESS NOTES
Awake and alert with no complaints. Ready for discharge. Discharge instructions reviewed with patient/responsible adult and understanding verbalized. Discharge instructions signed and copies given. Patient discharged home with belongings.

## 2022-01-05 NOTE — OP NOTE
Colonoscopy & Esophagogastroduodenoscopy Note  Patient:   William Thomas    YOB: 1945    Facility:   [facility] [Outpatient]   Referring/PCP: Savannah Castillo  Procedure:   Colonoscopy --diagnostic;     Esophagogastroduodenoscopy  with biopsy  Date:     1/5/2022  Endoscopist:  Ana Patricia MD, MD    Preoperative Diagnosis:  46OD F with h/o ischemic colitis (5/'20) who presents for evaluation of rectal bleeding and dark stools. Hgb 14.4 earlier this week. ASA was last used a few weeks ago. Omeprazole is consumed in treatment of acid reflux. She notes occ pyrosis, epigastric discomfort and intermittent dysphagia to solids. Postoperative Diagnosis: hiatal hernia, gastric ulcer, hemorrhoids    Anesthesia:  Per anesthesia      Estimated blood loss: Minimal    Complications: None    Description of Procedure:  Informed consent was obtained from the patient after explanation of the procedure including indications, description of the procedure,  benefits and possible risks and complications of the procedure, and alternatives. Questions were answered. The patient's history was reviewed and a directed physical examination was performed prior to the procedure. Patient was monitored throughout the procedure with pulse oximetry and periodic assessment of vital signs. Patient was sedated as noted above. The Nursing staff and I performed a time out. With the patient in the left lateral decubitus position, the Olympus videoendoscope was placed in the patient's mouth and under direct visualization passed into the esophagus. The scope was ultimately passed to the third portion of the duodenum. Visualization was performed during both introduction and withdrawal of the endoscope and retroflexed view of the proximal stomach was obtained. Findings[de-identified]   Esophagus: normal, biopsies were obtained to screen for EoE.  The findings do not support a diagnosis of Bay's Esophagus. Stomach: 2 5mm clean based ulcers were noted in the antrum, biopsied; 2cm hiatal hernia  Duodenum: normal    With the patient initially in the left lateral decubitus position, a digital rectal examination was performed and revealed internal hemorrhoids noted, external hemorrhoids noted. The Olympus video colonoscope was placed in the patient's rectum and advanced without difficulty  to the cecum, which was identified by the ileocecal valve and appendiceal orifice. Photographs were taken. The prep was adequate. Examination of the mucosa was performed during both introduction and withdrawal of the colonoscope. Retroflexed view of the rectum was performed. Withdrawal time was 6 minutes. Findings:   1.  Small internal and external hemorrhoids         Recommendations:   - await pathology results  - resume outpatient meds and regular diet  - avoid ASA and NSAIDs  - continue PPI though at BID dosing    Sandy Thomas MD

## 2022-01-05 NOTE — ANESTHESIA PRE PROCEDURE
Department of Anesthesiology  Preprocedure Note       Name:  Jeremy Hahn   Age:  68 y.o.  :  1945                                          MRN:  [id number]         Date:  2022      Surgeon: Dorcas Joyce):  Eliud Ramesh MD    Procedure: Procedure(s):  COLONOSCOPY  EGD    Medications prior to admission:   Prior to Admission medications    Medication Sig Start Date End Date Taking? Authorizing Provider   amLODIPine (NORVASC) 10 MG tablet Take 10 mg by mouth daily   Yes Historical Provider, MD   omeprazole (PRILOSEC) 40 MG delayed release capsule Take 40 mg by mouth 2 times daily   Yes Historical Provider, MD   venlafaxine (EFFEXOR XR) 75 MG extended release capsule Take 75 mg by mouth daily   Yes Historical Provider, MD   ClonazePAM (KLONOPIN PO) Take 0.5 mg by mouth 3 times daily    Yes Historical Provider, MD   Simvastatin (ZOCOR PO) Take 20 mg by mouth nightly    Yes Historical Provider, MD   Multiple Vitamins-Minerals (THERAPEUTIC MULTIVITAMIN-MINERALS) tablet Take 1 tablet by mouth daily   Yes Historical Provider, MD   Calcium Carbonate-Vitamin D 500-400 MG-UNIT TABS Take by mouth    Historical Provider, MD   fluticasone-salmeterol (ADVAIR HFA) 230-21 MCG/ACT inhaler Inhale 2 puffs into the lungs 2 times daily    Historical Provider, MD       Current medications:    Current Facility-Administered Medications   Medication Dose Route Frequency Provider Last Rate Last Admin    lactated ringers infusion   IntraVENous Continuous Eliud Ramesh MD        lidocaine 1 % injection 0.1 mL  0.1 mL IntraDERmal Once Eliud Ramesh MD           Allergies:     Allergies   Allergen Reactions    Amoxicillin Hives    Dicloxacillin Hives    Gluten Meal Diarrhea    Hexachlorophene      Phisohex - rash    Lactose     Pcn [Penicillins] Hives       Problem List:    Patient Active Problem List   Diagnosis Code    Chronic bilateral low back pain M54.50, G89.29    Lumbar degenerative disc disease SEVERE L45  M51.36    Fracture, lumbar vertebra, compression (HCC)   L5 AND L4 S32.000A    Lumbar spine instability   L45 M53.2X6    Colitis K52.9    Hypertension I10    Hyperlipidemia E78.5    Depression F32. A    Asthma J45.909       Past Medical History:        Diagnosis Date    Asthma     Back pain     Chronic pain     hip    Compression fracture     fourth lumbar vertebra with delayed healing    DDD (degenerative disc disease), cervical     Depression     Hyperlipidemia     Hypertension     Lumbar stenosis     Osteopenia     Osteoporosis     PONV (postoperative nausea and vomiting)     Sciatica     Seasonal allergies        Past Surgical History:        Procedure Laterality Date    CATARACT REMOVAL WITH IMPLANT Left 2017    Phacoemulsification/ Posterior Chamber Intraocular Lens Implantation w. dr Abbey Rivera Right 2018    COLONOSCOPY      COLONOSCOPY N/A 2020    COLONOSCOPY N/A 2020    COLONOSCOPY WITH BIOPSY performed by Master Meyer MD at [address] [place] [address]      titanium    TOOTH EXTRACTION      TUBAL LIGATION         Social History:    Social History     Tobacco Use    Smoking status: Former Smoker     Quit date: 1/3/1998     Years since quittin.0    Smokeless tobacco: Never Used   Substance Use Topics    Alcohol use:  No                                Counseling given: Not Answered      Vital Signs (Current):   Vitals:    22 1552 22 1401   BP:  (!) 149/80   Pulse:  77   Resp:  16   Temp:  97.5 °F (36.4 °C)   TempSrc:  Temporal   SpO2:  97%   Weight: 140 lb (63.5 kg)    Height: 5' 1.5\" (1.562 m)                                               BP Readings from Last 3 Encounters:   22 (!) 149/80   20 (!) 173/80   18 (!) 145/71       NPO Status:                                                                                 BMI:   Wt Readings from Last 3 Encounters:   22 140 lb (63.5 kg)   12/13/21 145 lb (65.8 kg)   05/23/20 140 lb (63.5 kg)     Body mass index is 26.02 kg/m². CBC:   Lab Results   Component Value Date    WBC 13.5 05/26/2020    RBC 4.11 05/26/2020    HGB 12.8 05/26/2020    HCT 38.8 05/26/2020    MCV 94.4 05/26/2020    RDW 13.8 05/26/2020     05/26/2020       CMP:   Lab Results   Component Value Date     05/26/2020    K 3.6 05/26/2020    K 3.6 05/24/2020     05/26/2020    CO2 25 05/26/2020    BUN 6 05/26/2020    CREATININE <0.5 05/26/2020    GFRAA >60 05/26/2020    AGRATIO 1.5 05/24/2020    LABGLOM >60 05/26/2020    GLUCOSE 110 05/26/2020    PROT 6.4 05/24/2020    CALCIUM 8.8 05/26/2020    BILITOT 1.3 05/24/2020    ALKPHOS 62 05/24/2020    AST 14 05/24/2020    ALT 8 05/24/2020       POC Tests: No results for input(s): POCGLU, POCNA, POCK, POCCL, POCBUN, POCHEMO, POCHCT in the last 72 hours. Coags: No results found for: PROTIME, INR, APTT    HCG (If Applicable): No results found for: PREGTESTUR, PREGSERUM, HCG, HCGQUANT     ABGs: No results found for: PHART, PO2ART, ANJ9UZK, OJD0EPP, BEART, Z8XICRJY     Type & Screen (If Applicable):  No results found for: LABABO, LABRH    Drug/Infectious Status (If Applicable):  No results found for: HIV, HEPCAB    COVID-19 Screening (If Applicable):   Lab Results   Component Value Date    COVID19 Not Detected 01/03/2022           Anesthesia Evaluation  Patient summary reviewed and Nursing notes reviewed   history of anesthetic complications: PONV. Airway: Mallampati: II     Neck ROM: full   Dental:          Pulmonary:   (+) COPD:  asthma:                            Cardiovascular:    (+) hypertension:,                   Neuro/Psych:   (+) neuromuscular disease:, psychiatric history:            GI/Hepatic/Renal:             Endo/Other:                     Abdominal:             Vascular: Other Findings:             Anesthesia Plan      MAC     ASA 2       Induction: intravenous.       Anesthetic plan and risks discussed with patient. Plan discussed with CRNA.                   Gini Hernandez MD   1/5/2022

## 2022-01-06 NOTE — ANESTHESIA POSTPROCEDURE EVALUATION
Department of Anesthesiology  Postprocedure Note    Patient: Angie Geiger  MRN: [id number]  YOB: 1945  Date of evaluation: 1/5/2022  Time:  8:44 PM     Procedure Summary     Date: 01/05/22 Room / Location: [facility]    Anesthesia Start: 1430 Anesthesia Stop: 9653    Procedures:       COLONOSCOPY (N/A )      EGD BIOPSY (N/A ) Diagnosis:       Black stool      (Black stool [K92.1])    Surgeons: Nora Dejesus MD Responsible Provider: Kari Dillon MD    Anesthesia Type: MAC ASA Status: 2          Anesthesia Type: MAC    Saqib Phase I: Saqib Score: 10    Saqib Phase II: Saqib Score: 10    Last vitals: Reviewed and per EMR flowsheets.        Anesthesia Post Evaluation    Comments: Postoperative Anesthesia Note    Name:    Angie Geiger  MRN:      [id number]    Patient Vitals in the past 12 hrs:  01/05/22 1531, BP:(!) 143/75, Pulse:75, Resp:16, SpO2:95 %  01/05/22 1520, BP:(!) 143/74, Pulse:74, Resp:16, SpO2:94 %  01/05/22 1505, BP:122/67, Pulse:77, Resp:16, SpO2:93 %  01/05/22 1401, BP:(!) 149/80, Temp:97.5 °F (36.4 °C), Temp src:Temporal, Pulse:77, Resp:16, SpO2:97 %     LABS:    CBC  Lab Results       Component                Value               Date/Time                  WBC                      13.5 (H)            05/26/2020 05:58 AM        HGB                      12.8                05/26/2020 05:58 AM        HCT                      38.8                05/26/2020 05:58 AM        PLT                      300                 05/26/2020 05:58 AM   RENAL  Lab Results       Component                Value               Date/Time                  NA                       141                 05/26/2020 05:58 AM        K                        3.6                 05/26/2020 05:58 AM        K                        3.6                 05/24/2020 07:18 AM        CL                       106                 05/26/2020 05:58 AM        CO2 25                  05/26/2020 05:58 AM        BUN                      6 (L)               05/26/2020 05:58 AM        CREATININE               <0.5 (L)            05/26/2020 05:58 AM        GLUCOSE                  110 (H)             05/26/2020 05:58 AM   COAGS  No results found for: PROTIME, INR, APTT    Intake & Output: In: 100 (I.V.:100)  Out: -     Nausea & Vomiting:  No    Level of Consciousness:  Awake    Pain Assessment:  Adequate analgesia    Anesthesia Complications:  No apparent anesthetic complications    SUMMARY      Vital signs stable  OK to discharge from Stage I post anesthesia care.   Care transferred from Anesthesiology department on discharge from perioperative area

## 2023-01-23 ENCOUNTER — HOSPITAL ENCOUNTER (OUTPATIENT)
Dept: MAMMOGRAPHY | Age: 78
Discharge: HOME | End: 2023-01-23
Payer: MEDICARE

## 2023-01-23 VITALS — HEIGHT: 62 IN | BODY MASS INDEX: 23.92 KG/M2 | WEIGHT: 130 LBS

## 2023-01-23 DIAGNOSIS — Z12.31 VISIT FOR SCREENING MAMMOGRAM: ICD-10-CM

## 2023-01-23 PROCEDURE — 77067 SCR MAMMO BI INCL CAD: CPT

## 2024-04-16 ENCOUNTER — HOSPITAL ENCOUNTER (OUTPATIENT)
Dept: MAMMOGRAPHY | Age: 79
Discharge: HOME | End: 2024-04-16
Payer: MEDICARE

## 2024-04-16 VITALS — BODY MASS INDEX: 26.68 KG/M2 | WEIGHT: 145 LBS | HEIGHT: 62 IN

## 2024-04-16 DIAGNOSIS — Z12.31 VISIT FOR SCREENING MAMMOGRAM: ICD-10-CM

## 2024-04-16 PROCEDURE — 77063 BREAST TOMOSYNTHESIS BI: CPT

## 2024-05-02 ENCOUNTER — APPOINTMENT (OUTPATIENT)
Dept: GENERAL RADIOLOGY | Age: 79
End: 2024-05-02
Payer: MEDICARE

## 2024-05-02 ENCOUNTER — HOSPITAL ENCOUNTER (EMERGENCY)
Age: 79
Discharge: HOME | End: 2024-05-02
Payer: MEDICARE

## 2024-05-02 VITALS
RESPIRATION RATE: 15 BRPM | WEIGHT: 145 LBS | TEMPERATURE: 98.2 F | HEIGHT: 62 IN | SYSTOLIC BLOOD PRESSURE: 150 MMHG | BODY MASS INDEX: 26.68 KG/M2 | OXYGEN SATURATION: 95 % | DIASTOLIC BLOOD PRESSURE: 86 MMHG | HEART RATE: 98 BPM

## 2024-05-02 DIAGNOSIS — S52.601A CLOSED FRACTURE OF DISTAL END OF RIGHT ULNA, UNSPECIFIED FRACTURE MORPHOLOGY, INITIAL ENCOUNTER: Primary | ICD-10-CM

## 2024-05-02 DIAGNOSIS — S52.501A CLOSED FRACTURE OF DISTAL END OF RIGHT RADIUS, UNSPECIFIED FRACTURE MORPHOLOGY, INITIAL ENCOUNTER: ICD-10-CM

## 2024-05-02 PROCEDURE — 73100 X-RAY EXAM OF WRIST: CPT

## 2024-05-02 PROCEDURE — 99283 EMERGENCY DEPT VISIT LOW MDM: CPT

## 2024-05-02 PROCEDURE — 29125 APPL SHORT ARM SPLINT STATIC: CPT

## 2024-05-02 ASSESSMENT — PAIN SCALES - GENERAL
PAINLEVEL_OUTOF10: 9
PAINLEVEL_OUTOF10: 0

## 2024-05-02 ASSESSMENT — LIFESTYLE VARIABLES
HOW OFTEN DO YOU HAVE A DRINK CONTAINING ALCOHOL: NEVER
HOW MANY STANDARD DRINKS CONTAINING ALCOHOL DO YOU HAVE ON A TYPICAL DAY: PATIENT DOES NOT DRINK

## 2024-05-02 ASSESSMENT — PAIN DESCRIPTION - DESCRIPTORS: DESCRIPTORS: ACHING

## 2024-05-02 ASSESSMENT — PAIN DESCRIPTION - ORIENTATION: ORIENTATION: RIGHT

## 2024-05-02 ASSESSMENT — PAIN DESCRIPTION - LOCATION: LOCATION: WRIST;ELBOW

## 2024-05-02 ASSESSMENT — PAIN - FUNCTIONAL ASSESSMENT
PAIN_FUNCTIONAL_ASSESSMENT: NONE - DENIES PAIN
PAIN_FUNCTIONAL_ASSESSMENT: 0-10

## 2024-05-03 ENCOUNTER — TELEPHONE (OUTPATIENT)
Dept: ORTHOPEDIC SURGERY | Age: 79
End: 2024-05-03

## 2024-05-03 ENCOUNTER — OFFICE VISIT (OUTPATIENT)
Dept: ORTHOPEDIC SURGERY | Age: 79
End: 2024-05-03
Payer: MEDICARE

## 2024-05-03 DIAGNOSIS — S52.571A OTHER CLOSED INTRA-ARTICULAR FRACTURE OF DISTAL END OF RIGHT RADIUS, INITIAL ENCOUNTER: Primary | ICD-10-CM

## 2024-05-03 PROBLEM — S52.501A CLOSED FRACTURE OF RIGHT DISTAL RADIUS: Status: ACTIVE | Noted: 2024-05-03

## 2024-05-03 PROCEDURE — G8399 PT W/DXA RESULTS DOCUMENT: HCPCS | Performed by: ORTHOPAEDIC SURGERY

## 2024-05-03 PROCEDURE — 1090F PRES/ABSN URINE INCON ASSESS: CPT | Performed by: ORTHOPAEDIC SURGERY

## 2024-05-03 PROCEDURE — G8428 CUR MEDS NOT DOCUMENT: HCPCS | Performed by: ORTHOPAEDIC SURGERY

## 2024-05-03 PROCEDURE — G8419 CALC BMI OUT NRM PARAM NOF/U: HCPCS | Performed by: ORTHOPAEDIC SURGERY

## 2024-05-03 PROCEDURE — 1123F ACP DISCUSS/DSCN MKR DOCD: CPT | Performed by: ORTHOPAEDIC SURGERY

## 2024-05-03 PROCEDURE — 1036F TOBACCO NON-USER: CPT | Performed by: ORTHOPAEDIC SURGERY

## 2024-05-03 PROCEDURE — 99204 OFFICE O/P NEW MOD 45 MIN: CPT | Performed by: ORTHOPAEDIC SURGERY

## 2024-05-03 RX ORDER — TRAMADOL HYDROCHLORIDE 50 MG/1
50 TABLET ORAL EVERY 6 HOURS PRN
Qty: 20 TABLET | Refills: 0 | Status: SHIPPED | OUTPATIENT
Start: 2024-05-03 | End: 2024-05-08

## 2024-05-03 RX ORDER — CEPHALEXIN 500 MG/1
500 CAPSULE ORAL 4 TIMES DAILY
Qty: 40 CAPSULE | Refills: 0 | Status: SHIPPED | OUTPATIENT
Start: 2024-05-03 | End: 2024-05-13

## 2024-05-03 NOTE — TELEPHONE ENCOUNTER
Dr. Bobo would like to see the patient today, 5/3/24 at [place] at 6:00 PM.  Upon return call, please contact Iris or Jamia     ANN for patient to return phone call to schedule ED follow up.

## 2024-05-06 ENCOUNTER — HOSPITAL ENCOUNTER (OUTPATIENT)
Age: 79
Setting detail: OUTPATIENT SURGERY
Discharge: HOME | End: 2024-05-06
Attending: ORTHOPAEDIC SURGERY | Admitting: ORTHOPAEDIC SURGERY
Payer: MEDICARE

## 2024-05-06 ENCOUNTER — APPOINTMENT (OUTPATIENT)
Dept: GENERAL RADIOLOGY | Age: 79
End: 2024-05-06
Attending: ORTHOPAEDIC SURGERY
Payer: MEDICARE

## 2024-05-06 ENCOUNTER — TELEPHONE (OUTPATIENT)
Dept: ORTHOPEDIC SURGERY | Age: 79
End: 2024-05-06

## 2024-05-06 ENCOUNTER — ANESTHESIA EVENT (OUTPATIENT)
Dept: OPERATING ROOM | Age: 79
End: 2024-05-06
Payer: MEDICARE

## 2024-05-06 ENCOUNTER — ANESTHESIA (OUTPATIENT)
Dept: OPERATING ROOM | Age: 79
End: 2024-05-06
Payer: MEDICARE

## 2024-05-06 VITALS
HEIGHT: 62 IN | BODY MASS INDEX: 26.35 KG/M2 | WEIGHT: 143.19 LBS | OXYGEN SATURATION: 94 % | DIASTOLIC BLOOD PRESSURE: 92 MMHG | RESPIRATION RATE: 16 BRPM | TEMPERATURE: 97 F | HEART RATE: 70 BPM | SYSTOLIC BLOOD PRESSURE: 124 MMHG

## 2024-05-06 PROCEDURE — 2709999900 HC NON-CHARGEABLE SUPPLY: Performed by: ORTHOPAEDIC SURGERY

## 2024-05-06 PROCEDURE — 3600000014 HC SURGERY LEVEL 4 ADDTL 15MIN: Performed by: ORTHOPAEDIC SURGERY

## 2024-05-06 PROCEDURE — C1713 ANCHOR/SCREW BN/BN,TIS/BN: HCPCS | Performed by: ORTHOPAEDIC SURGERY

## 2024-05-06 PROCEDURE — 2580000003 HC RX 258: Performed by: ORTHOPAEDIC SURGERY

## 2024-05-06 PROCEDURE — 6370000000 HC RX 637 (ALT 250 FOR IP): Performed by: STUDENT IN AN ORGANIZED HEALTH CARE EDUCATION/TRAINING PROGRAM

## 2024-05-06 PROCEDURE — 2720000010 HC SURG SUPPLY STERILE: Performed by: ORTHOPAEDIC SURGERY

## 2024-05-06 PROCEDURE — 2580000003 HC RX 258

## 2024-05-06 PROCEDURE — 7100000001 HC PACU RECOVERY - ADDTL 15 MIN: Performed by: ORTHOPAEDIC SURGERY

## 2024-05-06 PROCEDURE — 3600000004 HC SURGERY LEVEL 4 BASE: Performed by: ORTHOPAEDIC SURGERY

## 2024-05-06 PROCEDURE — 2500000003 HC RX 250 WO HCPCS

## 2024-05-06 PROCEDURE — 3700000001 HC ADD 15 MINUTES (ANESTHESIA): Performed by: ORTHOPAEDIC SURGERY

## 2024-05-06 PROCEDURE — 7100000000 HC PACU RECOVERY - FIRST 15 MIN: Performed by: ORTHOPAEDIC SURGERY

## 2024-05-06 PROCEDURE — 25609 OPTX DST RD XART FX/EP SEP3+: CPT | Performed by: NURSE PRACTITIONER

## 2024-05-06 PROCEDURE — A4217 STERILE WATER/SALINE, 500 ML: HCPCS | Performed by: ORTHOPAEDIC SURGERY

## 2024-05-06 PROCEDURE — 64415 NJX AA&/STRD BRCH PLXS IMG: CPT | Performed by: STUDENT IN AN ORGANIZED HEALTH CARE EDUCATION/TRAINING PROGRAM

## 2024-05-06 PROCEDURE — 3700000000 HC ANESTHESIA ATTENDED CARE: Performed by: ORTHOPAEDIC SURGERY

## 2024-05-06 PROCEDURE — 6360000002 HC RX W HCPCS

## 2024-05-06 PROCEDURE — 73100 X-RAY EXAM OF WRIST: CPT

## 2024-05-06 PROCEDURE — 7100000010 HC PHASE II RECOVERY - FIRST 15 MIN: Performed by: ORTHOPAEDIC SURGERY

## 2024-05-06 PROCEDURE — 7100000011 HC PHASE II RECOVERY - ADDTL 15 MIN: Performed by: ORTHOPAEDIC SURGERY

## 2024-05-06 PROCEDURE — 6360000002 HC RX W HCPCS: Performed by: ORTHOPAEDIC SURGERY

## 2024-05-06 PROCEDURE — 6360000002 HC RX W HCPCS: Performed by: STUDENT IN AN ORGANIZED HEALTH CARE EDUCATION/TRAINING PROGRAM

## 2024-05-06 PROCEDURE — 25609 OPTX DST RD XART FX/EP SEP3+: CPT | Performed by: ORTHOPAEDIC SURGERY

## 2024-05-06 PROCEDURE — 2500000003 HC RX 250 WO HCPCS: Performed by: STUDENT IN AN ORGANIZED HEALTH CARE EDUCATION/TRAINING PROGRAM

## 2024-05-06 DEVICE — LOCKING SCREW, FULLY THREADED,T8
Type: IMPLANTABLE DEVICE | Site: ARM | Status: FUNCTIONAL
Brand: VARIAX

## 2024-05-06 DEVICE — BONE SCREW, FULLY THREADED, T8
Type: IMPLANTABLE DEVICE | Site: ARM | Status: FUNCTIONAL
Brand: VARIAX

## 2024-05-06 DEVICE — VOLAR DR PLATE INTERM. RIGHT EXTRASHORT
Type: IMPLANTABLE DEVICE | Site: ARM | Status: FUNCTIONAL
Brand: VARIAX

## 2024-05-06 RX ORDER — ONDANSETRON 2 MG/ML
INJECTION INTRAMUSCULAR; INTRAVENOUS PRN
Status: DISCONTINUED | OUTPATIENT
Start: 2024-05-06 | End: 2024-05-06 | Stop reason: SDUPTHER

## 2024-05-06 RX ORDER — SCOLOPAMINE TRANSDERMAL SYSTEM 1 MG/1
1 PATCH, EXTENDED RELEASE TRANSDERMAL
Status: DISCONTINUED | OUTPATIENT
Start: 2024-05-06 | End: 2024-05-06 | Stop reason: HOSPADM

## 2024-05-06 RX ORDER — IPRATROPIUM BROMIDE AND ALBUTEROL SULFATE 2.5; .5 MG/3ML; MG/3ML
1 SOLUTION RESPIRATORY (INHALATION)
Status: DISCONTINUED | OUTPATIENT
Start: 2024-05-06 | End: 2024-05-06 | Stop reason: HOSPADM

## 2024-05-06 RX ORDER — SODIUM CHLORIDE 9 MG/ML
INJECTION, SOLUTION INTRAVENOUS CONTINUOUS PRN
Status: DISCONTINUED | OUTPATIENT
Start: 2024-05-06 | End: 2024-05-06 | Stop reason: SDUPTHER

## 2024-05-06 RX ORDER — DEXAMETHASONE SODIUM PHOSPHATE 4 MG/ML
INJECTION, SOLUTION INTRA-ARTICULAR; INTRALESIONAL; INTRAMUSCULAR; INTRAVENOUS; SOFT TISSUE PRN
Status: DISCONTINUED | OUTPATIENT
Start: 2024-05-06 | End: 2024-05-06 | Stop reason: SDUPTHER

## 2024-05-06 RX ORDER — LIDOCAINE HYDROCHLORIDE 20 MG/ML
INJECTION, SOLUTION EPIDURAL; INFILTRATION; INTRACAUDAL; PERINEURAL PRN
Status: DISCONTINUED | OUTPATIENT
Start: 2024-05-06 | End: 2024-05-06 | Stop reason: SDUPTHER

## 2024-05-06 RX ORDER — FENTANYL CITRATE 0.05 MG/ML
50 INJECTION, SOLUTION INTRAMUSCULAR; INTRAVENOUS EVERY 5 MIN PRN
Status: DISCONTINUED | OUTPATIENT
Start: 2024-05-06 | End: 2024-05-06 | Stop reason: HOSPADM

## 2024-05-06 RX ORDER — NALOXONE HYDROCHLORIDE 0.4 MG/ML
INJECTION, SOLUTION INTRAMUSCULAR; INTRAVENOUS; SUBCUTANEOUS PRN
Status: CANCELLED | OUTPATIENT
Start: 2024-05-06

## 2024-05-06 RX ORDER — FENTANYL CITRATE 50 UG/ML
INJECTION, SOLUTION INTRAMUSCULAR; INTRAVENOUS
Status: COMPLETED
Start: 2024-05-06 | End: 2024-05-06

## 2024-05-06 RX ORDER — FENTANYL CITRATE 0.05 MG/ML
25 INJECTION, SOLUTION INTRAMUSCULAR; INTRAVENOUS EVERY 5 MIN PRN
Status: DISCONTINUED | OUTPATIENT
Start: 2024-05-06 | End: 2024-05-06 | Stop reason: HOSPADM

## 2024-05-06 RX ORDER — SODIUM CHLORIDE 9 MG/ML
INJECTION, SOLUTION INTRAVENOUS PRN
Status: DISCONTINUED | OUTPATIENT
Start: 2024-05-06 | End: 2024-05-06 | Stop reason: HOSPADM

## 2024-05-06 RX ORDER — FENTANYL CITRATE 50 UG/ML
INJECTION, SOLUTION INTRAMUSCULAR; INTRAVENOUS
Status: COMPLETED | OUTPATIENT
Start: 2024-05-06 | End: 2024-05-06

## 2024-05-06 RX ORDER — SODIUM CHLORIDE 0.9 % (FLUSH) 0.9 %
5-40 SYRINGE (ML) INJECTION PRN
Status: DISCONTINUED | OUTPATIENT
Start: 2024-05-06 | End: 2024-05-06 | Stop reason: HOSPADM

## 2024-05-06 RX ORDER — PROPOFOL 10 MG/ML
INJECTION, EMULSION INTRAVENOUS PRN
Status: DISCONTINUED | OUTPATIENT
Start: 2024-05-06 | End: 2024-05-06 | Stop reason: SDUPTHER

## 2024-05-06 RX ORDER — BUPIVACAINE HYDROCHLORIDE AND EPINEPHRINE 5; 5 MG/ML; UG/ML
INJECTION, SOLUTION EPIDURAL; INTRACAUDAL; PERINEURAL
Status: COMPLETED | OUTPATIENT
Start: 2024-05-06 | End: 2024-05-06

## 2024-05-06 RX ORDER — DEXAMETHASONE SODIUM PHOSPHATE 4 MG/ML
INJECTION, SOLUTION INTRA-ARTICULAR; INTRALESIONAL; INTRAMUSCULAR; INTRAVENOUS; SOFT TISSUE
Status: COMPLETED | OUTPATIENT
Start: 2024-05-06 | End: 2024-05-06

## 2024-05-06 RX ORDER — BUPIVACAINE HYDROCHLORIDE 5 MG/ML
INJECTION, SOLUTION EPIDURAL; INTRACAUDAL
Status: COMPLETED | OUTPATIENT
Start: 2024-05-06 | End: 2024-05-06

## 2024-05-06 RX ORDER — SODIUM CHLORIDE 0.9 % (FLUSH) 0.9 %
5-40 SYRINGE (ML) INJECTION EVERY 12 HOURS SCHEDULED
Status: DISCONTINUED | OUTPATIENT
Start: 2024-05-06 | End: 2024-05-06 | Stop reason: HOSPADM

## 2024-05-06 RX ADMIN — BUPIVACAINE HYDROCHLORIDE 9 ML: 5 INJECTION, SOLUTION EPIDURAL; INTRACAUDAL; PERINEURAL at 09:44

## 2024-05-06 RX ADMIN — SODIUM CHLORIDE 2000 MG: 900 INJECTION INTRAVENOUS at 10:47

## 2024-05-06 RX ADMIN — DEXAMETHASONE SODIUM PHOSPHATE 2 MG: 4 INJECTION, SOLUTION INTRAMUSCULAR; INTRAVENOUS at 09:44

## 2024-05-06 RX ADMIN — ONDANSETRON 4 MG: 2 INJECTION INTRAMUSCULAR; INTRAVENOUS at 10:47

## 2024-05-06 RX ADMIN — PROPOFOL 120 MG: 10 INJECTION, EMULSION INTRAVENOUS at 10:44

## 2024-05-06 RX ADMIN — BUPIVACAINE HYDROCHLORIDE AND EPINEPHRINE BITARTRATE 9 ML: 5; .005 INJECTION, SOLUTION EPIDURAL; INTRACAUDAL; PERINEURAL at 09:44

## 2024-05-06 RX ADMIN — FENTANYL CITRATE 25 MCG: 50 INJECTION INTRAMUSCULAR; INTRAVENOUS at 11:21

## 2024-05-06 RX ADMIN — LIDOCAINE HYDROCHLORIDE 80 MG: 20 INJECTION, SOLUTION EPIDURAL; INFILTRATION; INTRACAUDAL; PERINEURAL at 10:44

## 2024-05-06 RX ADMIN — FENTANYL CITRATE 25 MCG: 50 INJECTION INTRAMUSCULAR; INTRAVENOUS at 10:55

## 2024-05-06 RX ADMIN — FENTANYL CITRATE 50 MCG: 50 INJECTION INTRAMUSCULAR; INTRAVENOUS at 09:40

## 2024-05-06 RX ADMIN — SODIUM CHLORIDE: 9 INJECTION, SOLUTION INTRAVENOUS at 10:40

## 2024-05-06 RX ADMIN — PROPOFOL 120 MCG/KG/MIN: 10 INJECTION, EMULSION INTRAVENOUS at 10:45

## 2024-05-06 RX ADMIN — DEXAMETHASONE SODIUM PHOSPHATE 8 MG: 4 INJECTION, SOLUTION INTRAMUSCULAR; INTRAVENOUS at 10:47

## 2024-05-06 ASSESSMENT — PAIN DESCRIPTION - DESCRIPTORS: DESCRIPTORS: THROBBING

## 2024-05-06 ASSESSMENT — PAIN - FUNCTIONAL ASSESSMENT
PAIN_FUNCTIONAL_ASSESSMENT: 0-10
PAIN_FUNCTIONAL_ASSESSMENT: PREVENTS OR INTERFERES SOME ACTIVE ACTIVITIES AND ADLS
PAIN_FUNCTIONAL_ASSESSMENT: NONE - DENIES PAIN
PAIN_FUNCTIONAL_ASSESSMENT: 0-10

## 2024-05-06 ASSESSMENT — LIFESTYLE VARIABLES: SMOKING_STATUS: 0

## 2024-05-06 NOTE — ANESTHESIA PROCEDURE NOTES
Peripheral Block    Patient location during procedure: pre-op  Reason for block: post-op pain management and at surgeon's request  Start time: 5/6/2024 9:41 AM  End time: 5/6/2024 9:45 AM  Staffing  Performed: anesthesiologist   Anesthesiologist: Ji Cruz MD  Performed by: Ji Cruz MD  Authorized by: Ji Cruz MD    Preanesthetic Checklist  Completed: patient identified, IV checked, site marked, risks and benefits discussed, surgical/procedural consents, equipment checked, pre-op evaluation, timeout performed, anesthesia consent given, oxygen available and monitors applied/VS acknowledged  Peripheral Block   Patient position: sitting  Prep: ChloraPrep  Patient monitoring: cardiac monitor, continuous pulse ox, frequent blood pressure checks and IV access  Block type: Brachial plexus and Axillary  Laterality: right  Injection technique: single-shot  Guidance: ultrasound guided    Needle   Needle type: insulated echogenic nerve stimulator needle   Needle gauge: 22 G  Needle localization: ultrasound guidance and anatomical landmarks  Needle length: 5 cm  Assessment   Injection assessment: negative aspiration for heme, no paresthesia on injection and local visualized surrounding nerve on ultrasound  Paresthesia pain: none  Slow fractionated injection: yes  Hemodynamics: stable  Outcomes: uncomplicated and patient tolerated procedure well    Additional Notes  Immediately prior to procedure a \"time out\" was called to verify the correct patient, allergies, laterality, procedure and equipment. Time out performed with Fransisco RN. Nasal cannula placed. Local Anesthetic: 0.5 %  Bupivacaine   Amount: 18 ml in increments after negative aspiration each time. Biceps, Triceps and Coracobrachialis muscles, Axillary artery and Axillary vein, Radial, ulnar and median nerve are identified; the tip of the needle and the spread of the local anesthetic around the Radial, ulnar and median nerves appeared to be

## 2024-05-06 NOTE — ANESTHESIA PRE PROCEDURE
Department of Anesthesiology  Preprocedure Note       Name:  Linda Stewart   Age:  78 y.o.  :  1945                                          MRN:  [id number]         Date:  2024      Surgeon: Surgeon(s):  Ryan Bobo MD    Procedure: Procedure(s):  OPEN REDUCTION INTERNAL FIXATION RIGHT DISTAL RADIUS    Medications prior to admission:   Prior to Admission medications    Medication Sig Start Date End Date Taking? Authorizing Provider   cephALEXin (KEFLEX) 500 MG capsule Take 1 capsule by mouth 4 times daily for 10 days 5/3/24 5/13/24  Ryan Bobo MD   traMADol (ULTRAM) 50 MG tablet Take 1 tablet by mouth every 6 hours as needed for Pain for up to 5 days. Intended supply: 5 days. Take lowest dose possible to manage pain Max Daily Amount: 200 mg 5/3/24 5/8/24  Ryan Bobo MD   amLODIPine (NORVASC) 10 MG tablet Take 1 tablet by mouth daily    Ant Jones MD   omeprazole (PRILOSEC) 40 MG delayed release capsule Take 1 capsule by mouth 2 times daily    Ant Jones MD   Calcium Carbonate-Vitamin D 500-400 MG-UNIT TABS Take by mouth    Ant Jones MD   fluticasone-salmeterol (ADVAIR HFA) 230-21 MCG/ACT inhaler Inhale 2 puffs into the lungs 2 times daily    Ant Jones MD   venlafaxine (EFFEXOR XR) 75 MG extended release capsule Take 1 capsule by mouth daily    Ant Jones MD   ClonazePAM (KLONOPIN PO) Take 0.5 mg by mouth 3 times daily     Ant Jones MD   Simvastatin (ZOCOR PO) Take 20 mg by mouth nightly     Ant Jones MD   Multiple Vitamins-Minerals (THERAPEUTIC MULTIVITAMIN-MINERALS) tablet Take 1 tablet by mouth daily    Ant Jones MD       Current medications:    Current Facility-Administered Medications   Medication Dose Route Frequency Provider Last Rate Last Admin    ceFAZolin (ANCEF) 2,000 mg in sodium chloride 0.9 % 50 mL IVPB (mini-bag)  2,000 mg IntraVENous Once Ryan Bobo MD        fentaNYL

## 2024-05-06 NOTE — DISCHARGE INSTRUCTIONS
Post op instruction:  1- Discharge home  2- Diagnosis Right wrist pain/ moderately displaced distal radius fracture.   3- No Weight Bearing right wrist  4- Elevation surgical site, with ice  5- Keep splint dry and clean  6- Follow up in 2 weeks.       Ryan Bobo MD, 5/6/2024      ANESTHESIA DISCHARGE INSTRUCTIONS    Wear your seatbelt home.  You are under the influence of drugs-do not drink alcohol, drive, operate machinery, make any important decisions or sign any legal documents for 24 hours.  Children should not ride bikes, skate boards or play on gym sets for 24 hours after surgery.  A responsible adult needs to be with you for 24 hours.  You may experience lightheadedness, dizziness, or sleepiness following surgery.  Rest at home today- increase activity as tolerated.  Progress slowly to a regular diet unless your physician has instructed you otherwise. Drink plenty of water.  If persistent nausea and vomiting becomes a problem, call your physician.  Coughing, sore throat and muscle aches are other side effects of anesthesia, and should improve with time.  Do not drive or operate machinery while taking narcotics.  Females of childbearing potential and on hormonal birth control, should use two forms of contraception following procedure if given a medication called sugammadex and/or emend. Additional contraception should be used for 7 days for sugammadex and/or 28 days for emend. These medications have a potential to reduce the effectiveness of hormonal birth control.

## 2024-05-06 NOTE — ANESTHESIA POSTPROCEDURE EVALUATION
Department of Anesthesiology  Postprocedure Note    Patient: Linda Stewart  MRN: [id number]  YOB: 1945  Date of evaluation: 5/6/2024    Procedure Summary       Date: 05/06/24 Room / Location: [address]    Anesthesia Start: 1040 Anesthesia Stop: 1137    Procedure: OPEN REDUCTION INTERNAL FIXATION RIGHT DISTAL RADIUS (Right) Diagnosis:       Closed fracture of distal end of right radius, unspecified fracture morphology, initial encounter      (Closed fracture of distal end of right radius, unspecified fracture morphology, initial encounter [S52.501A])    Surgeons: Ryan Bobo MD Responsible Provider: Ji Cruz MD    Anesthesia Type: General, TIVA + Regional ASA Status: 2            Anesthesia Type: General, TIVA + Regional    Saqib Phase I: Saqib Score: 9    Saqib Phase II: Saqib Score: 10    Anesthesia Post Evaluation    Patient location during evaluation: PACU  Patient participation: complete - patient participated  Level of consciousness: awake and sleepy but conscious  Airway patency: patent  Nausea & Vomiting: no nausea and no vomiting  Cardiovascular status: hemodynamically stable and blood pressure returned to baseline  Respiratory status: spontaneous ventilation, nonlabored ventilation and room air  Hydration status: stable  Comments: Ms. Stewart was seen comfortably conversing with staff following her procedure. No nausea following uneventful TIVA. Expectations for resolution of block reviewed. Anticipate return to [facility] for planned discharge home with .   Pain management: adequate    No notable events documented.

## 2024-05-06 NOTE — H&P
Preoperative H&P Update    The patient's History and Physical in the medical record from 5/3/2024 was reviewed by me today.    Past Medical History:   Diagnosis Date    Asthma     Back pain     Chronic pain     hip    Compression fracture     fourth lumbar vertebra with delayed healing    DDD (degenerative disc disease), cervical     Depression     Hyperlipidemia     Hypertension     Lumbar stenosis     Osteopenia     Osteoporosis     PONV (postoperative nausea and vomiting)     Sciatica     Seasonal allergies      Past Surgical History:   Procedure Laterality Date    CATARACT REMOVAL WITH IMPLANT Left 12/07/2017    Phacoemulsification/ Posterior Chamber Intraocular Lens Implantation w. dr zamora     CATARACT REMOVAL WITH IMPLANT Right 01/02/2018    COLONOSCOPY      COLONOSCOPY N/A 05/25/2020    COLONOSCOPY N/A 5/25/2020    COLONOSCOPY WITH BIOPSY performed by Rafa Hyman MD at [facility] ENDOSCOPY    COLONOSCOPY N/A 1/5/2022    COLONOSCOPY performed by Rafa Hyman MD at [facility] ENDOSCOPY    LUMBAR FUSION      titanium    TOOTH EXTRACTION      TUBAL LIGATION      UPPER GASTROINTESTINAL ENDOSCOPY N/A 1/5/2022    EGD BIOPSY performed by Rafa Hmyan MD at [facility] ENDOSCOPY     No current facility-administered medications on file prior to encounter.     Current Outpatient Medications on File Prior to Encounter   Medication Sig Dispense Refill    cephALEXin (KEFLEX) 500 MG capsule Take 1 capsule by mouth 4 times daily for 10 days 40 capsule 0    traMADol (ULTRAM) 50 MG tablet Take 1 tablet by mouth every 6 hours as needed for Pain for up to 5 days. Intended supply: 5 days. Take lowest dose possible to manage pain Max Daily Amount: 200 mg 20 tablet 0    amLODIPine (NORVASC) 10 MG tablet Take 1 tablet by mouth daily      omeprazole (PRILOSEC) 40 MG delayed release capsule Take 1 capsule by mouth 2 times daily      Calcium Carbonate-Vitamin D 500-400 MG-UNIT TABS Take by mouth

## 2024-05-06 NOTE — PROGRESS NOTES
Pt arrived to phase 2 from pacu a&o. VSS on monitor. Dressing right arm cdi elevated on pillows. Pt denies pain and nausea right arm numb from pre op block. Pt up to chair from bed tolerates transfer well. Drink given pt refuses snack and this time. Family called to bedside. Table and call light in reach.

## 2024-05-06 NOTE — TELEPHONE ENCOUNTER
Appointment Request     Patient requesting earlier appointment: Yes  Appointment offered to patient: JUNE 18, 2024  Patient Contact Number: [phone]     PT CALLING IN FOR A POST OP APPT IN 2 WEEKS/ PT REFUSED TO GO TO [facility].     BUT THE FIRST AVAIL WILL BE 6-.       PLEASE CALL BACK PT IF SOMEONE CX OF IF ABLE TO BE SQUEEZED IN.

## 2024-05-06 NOTE — PROGRESS NOTES
Discharge instructions reviewed with patient/responsible adult. All home medications have been reviewed, questions answered and patient verbalized understanding. Discharge instructions signed and copies given. IV removed. Pt dresses self. Pt stable ready for d/c home.

## 2024-05-07 NOTE — TELEPHONE ENCOUNTER
Due to no [facility] office on 5/28, Patient will need to come to  or [place]. 2 week period is 5/20-5/25

## 2024-05-07 NOTE — TELEPHONE ENCOUNTER
No answer. and Left message for return call to schedule. [phone].     Upon return call, patient can be notified we do not have a cancellation wait list, patient will need to schedule at either [place] or  for post op. Due to the Holiday on 5/27, our [place] schedule is only able to accommodate for emergent add ons for 5/21.  Patient needs to be seen 5/21-5/24.

## 2024-05-07 NOTE — OP NOTE
[facility]          [address] [place] [address]                            OPERATIVE REPORT      PATIENT NAME: LATESHA LAMA             : 1945  MED REC NO: [id number]                      ROOM: OR  ACCOUNT NO: [id number]                       ADMIT DATE: 2024  PROVIDER: Ryan Bobo MD      DATE OF PROCEDURE:  2024    SURGEON:  Ryan Bobo MD    ASSISTANT:  Jacquelyn Wheat CNP    PREOPERATIVE DIAGNOSIS:  Right distal radius 3-part intra-articular displaced fracture.    POSTOPERATIVE DIAGNOSIS:  Right distal radius 3-part intra-articular displaced fracture.    PROCEDURE DONE:  Open treatment of right distal radius 3-part intra-articular fracture open reduction and internal fixation.    ANESTHESIA:  General anesthesia.    ESTIMATED BLOOD LOSS:  Minimal.    COMPLICATIONS:  None.    TOURNIQUET:  Right upper arm 250 mmHg.    IMPLANT USED:  [employer] titanium intermediate volar locking plate with 7 distal 2.7 locking screws and 3 proximal screws.    INDICATION:  This is a 78-year-old white female who fell at home with right wrist injury.  She is right-hand dominant.  She was seen initially at [facility] and then she was seen in our office and recommended surgical fixation.  All risks, benefits, and alternatives were discussed with the patient including nonoperative treatment.  She elected to proceed with surgical fixation.    DESCRIPTION OF PROCEDURE:  The patient's right wrist was marked.  She received 2 g Ancef IV preoperatively.  The patient was then brought to the operating room, underwent general anesthesia.  A well-padded tourniquet was placed to right upper arm.  The right upper extremity was then prepped and draped in a regular sterile routine fashion.  A time-out was called to confirm the patient's name and site of procedure.    Esmarch was exsanguinated, tourniquet was inflated to 250 mmHg.  A volar approach was performed.

## 2024-05-07 NOTE — BRIEF OP NOTE
THRD - [id number]  SCREW BONE L14MM DIA2.7MM WR TI ALLOY NONLOCKING FULL THRD  [employer] HOWM-WD  Right 1 Implanted   SCREW BNE L10MM DIA2.7MM WRST TI ALLY JONO FULL THRD T8 DRV - [id number]  SCREW BNE L10MM DIA2.7MM WRST TI ALLY JONO FULL THRD T8 DRV  [employer] HOWM-WD  Right 1 Implanted         Drains: * No LDAs found *    Findings:  Infection Present At Time Of Surgery (PATOS) (choose all levels that have infection present):  No infection present  Other Findings: Same.    Electronically signed by Ryan Bobo MD on 5/6/2024 at 9:50 PM

## 2024-05-08 NOTE — ED PROVIDER NOTES
[FreeTextEntry1] : Pericardial effusion: No change for several years.  CBC chemistry profile thyroid function testing and CT of the scanning are unrevealing.  Continued observation is best option.  The patient is asymptomatic.\par \par Hyperlipidemia: Continue statin therapy.\par \par Carotid atherosclerosis: No clinical events.
splinting.        I am the Primary Clinician of Record.  FINAL IMPRESSION      1. Closed fracture of distal end of right ulna, unspecified fracture morphology, initial encounter    2. Closed fracture of distal end of right radius, unspecified fracture morphology, initial encounter          DISPOSITION/PLAN     DISPOSITION Decision to Discharge    PATIENT REFERRED TO:  Ryan Bobo MD  [address] [facility] [address]  [phone]    Schedule an appointment as soon as possible for a visit   For follow up      DISCHARGE MEDICATIONS:  Discharge Medication List as of 5/2/2024  8:47 PM          DISCONTINUED MEDICATIONS:  Discharge Medication List as of 5/2/2024  8:47 PM                 (Please note that portions of this note were completed with a voice recognition program.  Efforts were made to edit the dictations but occasionally words are mis-transcribed.)    CRISTINA Napier CNP (electronically signed)       Lion Roman APRN - CNP  05/07/24 4754

## 2024-05-21 ENCOUNTER — OFFICE VISIT (OUTPATIENT)
Dept: ORTHOPEDIC SURGERY | Age: 79
End: 2024-05-21
Payer: MEDICARE

## 2024-05-21 VITALS — BODY MASS INDEX: 26.31 KG/M2 | HEIGHT: 62 IN | WEIGHT: 143 LBS

## 2024-05-21 DIAGNOSIS — S52.501A CLOSED FRACTURE OF DISTAL END OF RIGHT RADIUS, UNSPECIFIED FRACTURE MORPHOLOGY, INITIAL ENCOUNTER: Primary | ICD-10-CM

## 2024-05-21 PROCEDURE — APPNB15 APP NON BILLABLE TIME 0-15 MINS: Performed by: NURSE PRACTITIONER

## 2024-05-21 PROCEDURE — 99024 POSTOP FOLLOW-UP VISIT: CPT | Performed by: NURSE PRACTITIONER

## 2024-05-21 PROCEDURE — L3908 WHO COCK-UP NONMOLDE PRE OTS: HCPCS | Performed by: NURSE PRACTITIONER

## 2024-05-21 NOTE — PROGRESS NOTES
DIAGNOSIS:  Right distal radius 3 parts intra articular displaced fracture, status post ORIF.    DATE OF SURGERY: 5/6/2024.    HISTORY OF PRESENT ILLNESS:  Ms. Stewart 78 y.o.  female right handed returns today  for 2 weeks postoperative visit.  The patient denies any significant pain in the right wrist.  Rates pain a 5/10 VAS mild, aching, intermittent and are improving. Aggravating factors movement. Alleviating factors rest.  No numbness or tingling sensation. No fever or Chills. She  is in a splint.  Denies smoking.    PHYSICAL EXAMINATION:  The incision is completely healed .  No signs of any erythema or drainage. She  has no pain with the active or passive range of motion of the right wrist, but decrease ROM.  She  has intact sensation, distally, and she  is neurovascularly intact.    IMAGING:  Three views right wrist taken today in the office showed anatomic alignment of right distal radius, plate and screws in good position, no loosening.      IMPRESSION:  2 weeks out from right distal radius ORIF and doing very well.    PLAN:  I have told the patient to work on ROM, as well as strengthening exercises. A removable forearm brace applied. No heavy impact activities. The patient will come back for a follow up in 6 weeks.  At that time, we will take 3 views of the right wrist. PT if needed then.    As this patient has demonstrated risk factors for osteoporosis, such as age greater than fifty years and evidence of a fracture, I have referred the patient back to the primary care physician for evaluation for osteoporosis, including consideration for DEXA scanning, if this is felt to be clinically indicated.  The patient is advised to contact the primary care physician to follow-up for further evaluation.         Procedures    Allison Hilton Titan Wrist Long Forearm Brace     Patient was prescribed a Allison Hilton Titan Wrist and Forearm Brace.   The right wrist will require stabilization / immobilization from this

## 2024-05-24 ENCOUNTER — TELEPHONE (OUTPATIENT)
Dept: ORTHOPEDIC SURGERY | Age: 79
End: 2024-05-24

## 2024-05-24 NOTE — TELEPHONE ENCOUNTER
Linda is concerned about swelling that she is still having in the wrist. She reports that the swelling improves and then comes back each day with activity. No pitting edema on questioning. I advised that this swelling pattern sounds normal and suggested increased elevation of the hand above heart level and increased ice application for a few days. She understood. Discussed concerning signs and symptoms if they should develop. She will call back next week if worsening.

## 2024-05-24 NOTE — TELEPHONE ENCOUNTER
Other PATIENT CALLED STATES THAT SHE HAS QUESTIONS REGARDING HER FRACTURED WRIST. PLS CALL TO ASSIST [phone]

## 2024-06-03 ENCOUNTER — TELEPHONE (OUTPATIENT)
Dept: ORTHOPEDIC SURGERY | Age: 79
End: 2024-06-03

## 2024-06-03 NOTE — TELEPHONE ENCOUNTER
Patient states that she gets  some swelling and pain in the thumb and elbow. She is not able to hold a fork yet. She wanted to make sure this is normal.     Informed her that the pain and swelling is normal for this period of healing. Dr. Bobo will send her to OT if she continues to have trouble with fine motor skills at her next apt.

## 2024-06-03 NOTE — TELEPHONE ENCOUNTER
General Question     Subject: POST OP ISSUES  Patient and /or Facility Request: Linda Stewart   Contact Number: [phone]        THE PT IS STILL HAVING RT WRIST PAIN AND SWELLING STILL FROM THE SX, THE ELBOW HURTS.  THE PATIENT WANTS TO KNOW IF THIS IS NORMAL.  SX WAS MAY 7.

## 2024-06-06 ENCOUNTER — TELEPHONE (OUTPATIENT)
Dept: ORTHOPEDIC SURGERY | Age: 79
End: 2024-06-06

## 2024-06-06 NOTE — TELEPHONE ENCOUNTER
Patient was educated on using a barrier like old clean sock to help create a barrier between skin and brace

## 2024-06-06 NOTE — TELEPHONE ENCOUNTER
General Question     Subject: POST OP QUESTION  Patient and /or Facility Request: Linda Stewart   Contact Number: [phone]     PT ASKED IF THERE IS A DIFFERENT WRAP SHE CAN USE THAT ISN'T THE BLACK VELCRO ITS CAUSING HER TO BREAK UP IN A  RASH SINCE THE WEATHER HAS GOTTEN WARMER    PLEASE CLL TO ADV

## 2024-07-02 ENCOUNTER — OFFICE VISIT (OUTPATIENT)
Dept: ORTHOPEDIC SURGERY | Age: 79
End: 2024-07-02

## 2024-07-02 VITALS — HEIGHT: 62 IN | BODY MASS INDEX: 26.16 KG/M2

## 2024-07-02 DIAGNOSIS — S52.501A CLOSED FRACTURE OF DISTAL END OF RIGHT RADIUS, UNSPECIFIED FRACTURE MORPHOLOGY, INITIAL ENCOUNTER: Primary | ICD-10-CM

## 2024-07-02 PROCEDURE — 99024 POSTOP FOLLOW-UP VISIT: CPT | Performed by: ORTHOPAEDIC SURGERY

## 2024-07-02 NOTE — PROGRESS NOTES
DIAGNOSIS:  Right distal radius 3 parts intra articular displaced fracture, status post ORIF.    DATE OF SURGERY: 5/6/2024.    HISTORY OF PRESENT ILLNESS:  Ms. Stewart 79 y.o.  female right handed returns today for 2 months postoperative visit.  The patient denies any significant pain in the right wrist.  Rates pain a 1/10 VAS mild, aching, intermittent and are improving. Aggravating factors movement. Alleviating factors rest. No numbness or tingling sensation. No fever or Chills.      PHYSICAL EXAMINATION:  The incision is completely healed .  No signs of any erythema or drainage. She  has no pain with the active or passive range of motion of the right wrist, good ROM.  She  has intact sensation, distally, and she  is neurovascularly intact.    IMAGING:  Three views right wrist taken today in the office showed anatomic alignment of right distal radius, plate and screws in good position, no loosening.      IMPRESSION:  2 months out from right distal radius ORIF and doing very well.    PLAN:  I have told the patient to work on ROM, as well as strengthening exercises. No heavy impact activities. The patient will come back for a follow up in 6 weeks.  At that time, we will take 3 views of the right wrist. PT if needed then.    As this patient has demonstrated risk factors for osteoporosis, such as age greater than fifty years and evidence of a fracture, I have referred the patient back to the primary care physician for evaluation for osteoporosis, including consideration for DEXA scanning, if this is felt to be clinically indicated.  The patient is advised to contact the primary care physician to follow-up for further evaluation.       Ryan Bobo MD

## 2024-10-05 ENCOUNTER — OFFICE VISIT (OUTPATIENT)
Age: 79
End: 2024-10-05

## 2024-10-05 VITALS
HEART RATE: 84 BPM | DIASTOLIC BLOOD PRESSURE: 84 MMHG | SYSTOLIC BLOOD PRESSURE: 156 MMHG | TEMPERATURE: 97.4 F | OXYGEN SATURATION: 96 %

## 2024-10-05 DIAGNOSIS — L72.9 SUBCUTANEOUS CYST: Primary | ICD-10-CM

## 2024-10-05 DIAGNOSIS — R03.0 ELEVATED BLOOD PRESSURE READING: ICD-10-CM

## 2024-10-05 NOTE — PATIENT INSTRUCTIONS
Follow-up with your dermatologist.     If they do not remove it, they may refer you to general surgery.

## 2024-10-05 NOTE — PROGRESS NOTES
Linda Stewart (:  1945) is a 79 y.o. female,  here for evaluation of the following chief complaint(s): Cyst (Upper left back/Pt has mass that sticks out/Pt states it has been there about 6-months and now is bigger and wants it looked at and it started becoming painful)      Linda Stewart is a New patient.   Last Urgent Care Visit: Visit date not found  I have reviewed the patient's medications; see Medication Reconciliation.    ASSESSMENT/PLAN:  Diagnosis:     ICD-10-CM    1. Subcutaneous cyst  L72.9       2. Elevated blood pressure reading  R03.0 Amb Referral to Primary Care             Medical Decision Making:    Patient was seen at Urgent Care today for subcutaneous cyst on the left thoracic back.   This is a semi-firm, mobile, non-fluctuant mass consistent with subcutaneous cyst. There is no evidence of infectious processes.     Pt is established with dermatologist who apparently is removing another cyst. She will follow-up with them.     Patient was discharged home with follow-up and return precautions.     Patient's initial blood pressure was elevated greater than 120/80. BP was rechecked prior to discharge and remained elevated > 140/90. Therefore, patient is referred to PCP. Patient is counseled on the elevated blood including prevention and management.    No orders of the defined types were placed in this encounter.      Results:  No results found for any visits on 10/05/24.       SUBJECTIVE/OBJECTIVE:  HPI:   This is a 79 y.o. female that presents today with complaint of: cyst on the left mid back.   This has been there for \"quite some time\".   It recently has slightly enlarged. It has not been red, warm and no discharge or drainage.   It is starting to get irritating with her bra strap and mildly painful in the AM. Otherwise, it is not painful or tender during the day.       Vitals:    10/05/24 1309 10/05/24 1345   BP: (!) 156/90 (!) 156/84   Pulse: 84    Temp: 97.4 °F (36.3 °C)    TempSrc:

## 2024-12-17 ENCOUNTER — OFFICE VISIT (OUTPATIENT)
Dept: ORTHOPEDIC SURGERY | Age: 79
End: 2024-12-17
Payer: MEDICARE

## 2024-12-17 VITALS — HEIGHT: 62 IN | WEIGHT: 143 LBS | BODY MASS INDEX: 26.31 KG/M2

## 2024-12-17 DIAGNOSIS — S52.571A OTHER CLOSED INTRA-ARTICULAR FRACTURE OF DISTAL END OF RIGHT RADIUS, INITIAL ENCOUNTER: Primary | ICD-10-CM

## 2024-12-17 PROCEDURE — 1123F ACP DISCUSS/DSCN MKR DOCD: CPT | Performed by: ORTHOPAEDIC SURGERY

## 2024-12-17 PROCEDURE — 1090F PRES/ABSN URINE INCON ASSESS: CPT | Performed by: ORTHOPAEDIC SURGERY

## 2024-12-17 PROCEDURE — G8419 CALC BMI OUT NRM PARAM NOF/U: HCPCS | Performed by: ORTHOPAEDIC SURGERY

## 2024-12-17 PROCEDURE — 99213 OFFICE O/P EST LOW 20 MIN: CPT | Performed by: ORTHOPAEDIC SURGERY

## 2024-12-17 PROCEDURE — G8399 PT W/DXA RESULTS DOCUMENT: HCPCS | Performed by: ORTHOPAEDIC SURGERY

## 2024-12-17 PROCEDURE — 1036F TOBACCO NON-USER: CPT | Performed by: ORTHOPAEDIC SURGERY

## 2024-12-17 PROCEDURE — G8428 CUR MEDS NOT DOCUMENT: HCPCS | Performed by: ORTHOPAEDIC SURGERY

## 2024-12-17 PROCEDURE — 1126F AMNT PAIN NOTED NONE PRSNT: CPT | Performed by: ORTHOPAEDIC SURGERY

## 2024-12-17 PROCEDURE — G8484 FLU IMMUNIZE NO ADMIN: HCPCS | Performed by: ORTHOPAEDIC SURGERY

## 2024-12-17 NOTE — PROGRESS NOTES
DIAGNOSIS:  Right distal radius 3 parts intra articular displaced fracture, status post ORIF.    DATE OF SURGERY: 5/6/2024.    HISTORY OF PRESENT ILLNESS:  Ms. Stewart 79 y.o.  female right handed returns today for 6 months postoperative visit.  The patient denies any significant pain in the right wrist.  Rates pain a 0/10 VAS mild, aching, intermittent and are improving. Aggravating factors movement. Alleviating factors rest. No numbness or tingling sensation. No fever or Chills.      Past Medical History:   Diagnosis Date    Asthma     Back pain     Chronic pain     hip    Compression fracture     fourth lumbar vertebra with delayed healing    DDD (degenerative disc disease), cervical     Depression     Hyperlipidemia     Hypertension     Lumbar stenosis     Osteopenia     Osteoporosis     PONV (postoperative nausea and vomiting)     Sciatica     Seasonal allergies        Past Surgical History:   Procedure Laterality Date    CATARACT REMOVAL WITH IMPLANT Left 12/07/2017    Phacoemulsification/ Posterior Chamber Intraocular Lens Implantation w. dr zamora     CATARACT REMOVAL WITH IMPLANT Right 01/02/2018    COLONOSCOPY      COLONOSCOPY N/A 05/25/2020    COLONOSCOPY N/A 5/25/2020    COLONOSCOPY WITH BIOPSY performed by Rafa Hyman MD at [facility] ENDOSCOPY    COLONOSCOPY N/A 1/5/2022    COLONOSCOPY performed by Rafa Hyman MD at [facility] ENDOSCOPY    FOREARM SURGERY Right 5/6/2024    OPEN REDUCTION INTERNAL FIXATION RIGHT DISTAL RADIUS performed by Ryan Bobo MD at [facility] OR    LUMBAR FUSION      titanium    TOOTH EXTRACTION      TUBAL LIGATION      UPPER GASTROINTESTINAL ENDOSCOPY N/A 1/5/2022    EGD BIOPSY performed by Rafa Hyman MD at [facility] ENDOSCOPY       Social History     Socioeconomic History    Marital status:      Spouse name: Not on file    Number of children: Not on file    Years of education: Not on file    Highest education level: Not on file   Occupational

## (undated) DEVICE — SYRINGE IRRIG 60ML SFT PLIABLE BLB EZ TO GRP 1 HND USE W/

## (undated) DEVICE — STRIP,CLOSURE,WOUND,MEDI-STRIP,1/2X4: Brand: MEDLINE

## (undated) DEVICE — ELECTRODE PT RET AD L9FT HI MOIST COND ADH HYDRGEL CORDED

## (undated) DEVICE — Z DISCONTINUED USE 2276105 GOWN PROTCT UNIV CHST W28IN L49IN SL 24IN BLU SPUNBOND FLM

## (undated) DEVICE — GLOVE SURG SZ 65 THK91MIL LTX FREE SYN POLYISOPRENE

## (undated) DEVICE — C-ARM: Brand: UNBRANDED

## (undated) DEVICE — PADDING,UNDERCAST,COTTON, 4"X4YD STERILE: Brand: MEDLINE

## (undated) DEVICE — CONMED SCOPE SAVER BITE BLOCK, 20X27 MM: Brand: SCOPE SAVER

## (undated) DEVICE — KIRSCHNER WIRE
Type: IMPLANTABLE DEVICE | Site: ARM | Status: NON-FUNCTIONAL
Brand: VARIAX
Removed: 2024-05-06

## (undated) DEVICE — BANDAGE COMPR W4INXL15FT BGE E SGL LAYERED CLP CLSR

## (undated) DEVICE — MASTISOL ADHESIVE LIQ 2/3ML

## (undated) DEVICE — DRESSING,GAUZE,XEROFORM,CURAD,1"X8",ST: Brand: CURAD

## (undated) DEVICE — 4-PORT MANIFOLD: Brand: NEPTUNE 2

## (undated) DEVICE — SUTURE NONABSORBABLE MONOFILAMENT 4-0 FS-2 18 IN ETHILON 662H

## (undated) DEVICE — SUTURE MONOCRYL + SZ 4-0 L18IN ABSRB UD L19MM PS-2 3/8 CIR MCP496G

## (undated) DEVICE — ENDOSCOPIC KIT 2 12 FT OP4 DE2 GWN SYR

## (undated) DEVICE — ELECTRODE,ECG,STRESS,FOAM,3PK: Brand: MEDLINE

## (undated) DEVICE — GOWN SIRUS NONREIN LG W/TWL: Brand: MEDLINE INDUSTRIES, INC.

## (undated) DEVICE — KIT INF CTRL 2OZ LUB TBNG L12FT DBL END BRSH SYR OP4

## (undated) DEVICE — DRILL, AO, SCALED: Brand: VARIAX

## (undated) DEVICE — GLOVE SURG SZ 8 CRM LTX FREE POLYISOPRENE POLYMER BEAD ANTI

## (undated) DEVICE — SUTURE VICRYL + SZ 3-0 L18IN ABSRB UD SH 1/2 CIR TAPERCUT NDL VCP864D

## (undated) DEVICE — FORCEPS BX L240CM JAW DIA2.8MM L CAP W/ NDL MIC MESH TOOTH

## (undated) DEVICE — GLOVE SURG SZ 65 L12IN FNGR THK79MIL GRN LTX FREE

## (undated) DEVICE — UNTHREADED GUIDE WIRE: Brand: FIXOS

## (undated) DEVICE — CANNULA NSL CO2 O2 AD

## (undated) DEVICE — GLOVE SURG SZ 6 L12IN FNGR THK79MIL GRN LTX FREE

## (undated) DEVICE — CANNULA NSL AD TBNG L7FT PVC STR NONFLARED PRNG O2 DEL W STD

## (undated) DEVICE — FORMALIN  10%NBF 20ML PREFLL CONT

## (undated) DEVICE — BANDAGE COMPR W4INXL12FT E DISP ESMARCH EVEN

## (undated) DEVICE — HAND AND ELBOW: Brand: MEDLINE INDUSTRIES, INC.

## (undated) DEVICE — AIRLIFE™ NASAL OXYGEN CANNULA CURVED, FLARED TIP, WITH 7 FEET (2.1 M) CRUSH RESISTANT TUBING, OVER-THE-EAR STYLE: Brand: AIRLIFE™

## (undated) DEVICE — MERCY HEALTH WEST TURNOVER: Brand: MEDLINE INDUSTRIES, INC.